# Patient Record
Sex: FEMALE | Race: WHITE
[De-identification: names, ages, dates, MRNs, and addresses within clinical notes are randomized per-mention and may not be internally consistent; named-entity substitution may affect disease eponyms.]

---

## 2018-12-30 ENCOUNTER — HOSPITAL ENCOUNTER (EMERGENCY)
Dept: HOSPITAL 41 - JD.ED | Age: 68
Discharge: NURSING, REHAB OR LONG-TERM CARE | End: 2018-12-30
Payer: MEDICARE

## 2018-12-30 DIAGNOSIS — F17.210: ICD-10-CM

## 2018-12-30 DIAGNOSIS — Z79.899: ICD-10-CM

## 2018-12-30 DIAGNOSIS — I21.21: Primary | ICD-10-CM

## 2018-12-30 PROCEDURE — 82553 CREATINE MB FRACTION: CPT

## 2018-12-30 PROCEDURE — 96365 THER/PROPH/DIAG IV INF INIT: CPT

## 2018-12-30 PROCEDURE — 94640 AIRWAY INHALATION TREATMENT: CPT

## 2018-12-30 PROCEDURE — 83735 ASSAY OF MAGNESIUM: CPT

## 2018-12-30 PROCEDURE — 85007 BL SMEAR W/DIFF WBC COUNT: CPT

## 2018-12-30 PROCEDURE — 93005 ELECTROCARDIOGRAM TRACING: CPT

## 2018-12-30 PROCEDURE — 85027 COMPLETE CBC AUTOMATED: CPT

## 2018-12-30 PROCEDURE — 99285 EMERGENCY DEPT VISIT HI MDM: CPT

## 2018-12-30 PROCEDURE — 36415 COLL VENOUS BLD VENIPUNCTURE: CPT

## 2018-12-30 PROCEDURE — 83880 ASSAY OF NATRIURETIC PEPTIDE: CPT

## 2018-12-30 PROCEDURE — 86140 C-REACTIVE PROTEIN: CPT

## 2018-12-30 PROCEDURE — 84484 ASSAY OF TROPONIN QUANT: CPT

## 2018-12-30 PROCEDURE — 96375 TX/PRO/DX INJ NEW DRUG ADDON: CPT

## 2018-12-30 PROCEDURE — 80053 COMPREHEN METABOLIC PANEL: CPT

## 2018-12-30 PROCEDURE — 85379 FIBRIN DEGRADATION QUANT: CPT

## 2018-12-30 PROCEDURE — 71045 X-RAY EXAM CHEST 1 VIEW: CPT

## 2018-12-30 PROCEDURE — 81001 URINALYSIS AUTO W/SCOPE: CPT

## 2018-12-30 NOTE — CR
Chest: Portable view of the chest was obtained.

 

Comparison: No prior chest x-ray.

 

Heart size is normal.  Atherosclerotic change is noted within the 

thoracic aorta.  Slightly lobulated right hemidiaphragm is seen.  Lung

 markings are mildly increased most likely accentuated from portable 

technique.  No acute parenchymal densities are appreciated.

 

Impression:

1.  Incidental findings.  Nothing acute is suspected on portable chest

 x-ray.

 

Diagnostic code #2

## 2018-12-30 NOTE — EDM.PDOC
ED HPI GENERAL MEDICAL PROBLEM





- General


Chief Complaint: Respiratory Problem


Stated Complaint: SOB


Time Seen by Provider: 12/30/18 15:16


Source of Information: Reports: Patient


History Limitations: Reports: No Limitations





- History of Present Illness


INITIAL COMMENTS - FREE TEXT/NARRATIVE: 


68-year-old female presents to the ED with acute onset of severe dyspnea and 

inability to get her breath. Patient has been traveling for the last day and a 

half. Traveling from [place] back to [place]. Just got off the airplane and 

found out that she was extremely dyspneic getting off the airplane and for a 

while thought she was given a keel over. She does have a very productive 

sounding cough. She states that she was fine when she got on the airplane but 

things seem to change in the last 24 hours. No fever or chills. She smokes 3-5 

cigarettes a day. Has known COPD. Does not use oxygen at home. She uses 

albuterol inhaler when necessary. Has seasonal allergies requiring Kenalog 

injections in the spring and fall. She states her son's home was quite moldy 

out of [place]. She denies any chest pain. She can't bring up any sputum. He 

did vomit once getting off the airplane which is mostly mucus and she feels 

better since vomiting. O2 sats on room air are 91-93%. She is audibly wheezing. 

She's not sure if she has any history of congestive failure. By history likely 

has cor pulmonale.





Onset: Today


Onset Date: 12/30/18


Onset Time: 13:00


Duration: Hour(s):


Location: Reports: Chest (Sudden onset of worsening dyspnea on minimal exertion 

with harsh paroxysmal productive sounding cough dizziness and weakness. 

Vomiting 1.)


Quality: Reports: Other (Dyspnea at rest)


Severity: Moderate (8 out of 10)


Improves with: Reports: Rest


Worsens with: Reports: Movement


Context: Reports: Other (Has been traveling just got off the airplane after 

traveling from [place] to [facility] to [place] to [place].).  Denies

: Activity, Exercise, Lifting, Sick Contact, Trauma


Associated Symptoms: Reports: Cough, cough w sputum, Loss of Appetite, Malaise, 

Nausea/Vomiting, Shortness of Breath, Weakness.  Denies: Confusion, Chest Pain, 

Diaphoresis, Fever/Chills, Headaches, Rash, Seizure (Vomited once after getting 

off the airplane), Syncope


Treatments PTA: Reports: Other (see below) (Generalized has taken no 

medications.)





- Related Data


 Allergies











Allergy/AdvReac Type Severity Reaction Status Date / Time


 


No Known Allergies Allergy   Verified 12/30/18 14:41











Home Meds: 


 Home Meds





Albuterol [Proventil HFA] 1 inh INH ASDIRECTED PRN 12/30/18 [History]


LORazepam [Ativan] 1 mg PO TID 12/30/18 [History]


PARoxetine HCl [Paxil] 40 mg PO DAILY 12/30/18 [History]


Zolpidem [Ambien] 10 mg PO BEDTIME 12/30/18 [History]


clonazePAM [Klonopin] 1 mg PO BID 12/30/18 [History]











Past Medical History


Respiratory History: Reports: Other (See Below)


Other Respiratory History: allergies


Psychiatric History: Reports: Anxiety





Social & Family History





- Tobacco Use


Smoking Status *Q: Current Every Day Smoker


Years of Tobacco use: 45


Packs/Tins Daily: 0.5





- Caffeine Use


Caffeine Use: Reports: Coffee





- Recreational Drug Use


Recreational Drug Use: No





- Living Situation & Occupation


Living situation: Reports: 


Occupation: Retired





ED ROS GENERAL





- Review of Systems


Review Of Systems: See Below


Constitutional: Reports: Malaise, Weakness, Fatigue, Decreased Appetite.  Denies

: Fever, Chills


HEENT: Reports: Glasses


Respiratory: Reports: Shortness of Breath, Wheezing, Cough, Sputum.  Denies: 

Pleuritic Chest Pain, Hemoptysis


Cardiovascular: Reports: Dyspnea on Exertion (Chronically), Lightheadedness.  

Denies: Chest Pain, Blood Pressure Problem, Claudication, Edema, Orthopnea


Endocrine: Reports: Fatigue


GI/Abdominal: Reports: Constipation (Occasional positive constipation), Nausea, 

Vomiting (Vomited once after getting off the airplane but feels better now.)


: Reports: Frequency, Incontinence (Urge and stress components)


Musculoskeletal: Reports: Back Pain, Joint Pain


Skin: Reports: No Symptoms (These hips and neck and shoulders at times)


Neurological: Reports: No Symptoms


Psychiatric: Reports: No Symptoms


Hematologic/Lymphatic: Reports: No Symptoms


Immunologic: Reports: No Symptoms





ED EXAM, GENERAL





- Physical Exam


Exam: See Below


Exam Limited By: No Limitations


General Appearance: Alert, WD/WN, Moderate Distress, Other (Radiation color)


Eye Exam: Bilateral Eye: Normal Inspection


Throat/Mouth: Normal Inspection, Normal Oropharynx


Head: Atraumatic, Normocephalic


Neck: Normal Inspection, Supple, Non-Tender, Full Range of Motion.  No: 

Lymphadenopathy (L), Lymphadenopathy (R)


Respiratory/Chest: No Accessory Muscle Use, Respiratory Distress (Mild 

tachypnea 20/m.), Decreased Breath Sounds (No air entry to the lower third of 

each lung bilaterally.), Wheezing (Use inspiratory and especially expiratory 

wheezes throughout.).  No: Rales, Rhonchi


Cardiovascular: No Gallop, No Murmur, No Rub, Tachycardia


Peripheral Pulses: 1+: Posterior Tibial (L) (Pulses are very weak to her feet. 

Suspect a component of peripheral vascular disease.), Posterior Tibial (R), 

Dorsalis Pedis (L), Dorsalis Pedis (R)


GI/Abdominal: Normal Bowel Sounds, Soft, Non-Tender, No Organomegaly, No 

Abnormal Bruit, No Mass, Pelvis Stable, Other (Ronald minimal distention of her 

epigastrium compatible with aerophagia)


Back Exam: Normal Inspection, Full Range of Motion.  No: CVA Tenderness (L), 

CVA Tenderness (R)


Extremities: Normal Inspection, Normal Range of Motion, Non-Tender, Other


Neurological: Alert, Oriented (Cyanosis of her hands bilaterally), CN II-XII 

Intact, Normal Cognition


Psychiatric: Normal Affect, Normal Mood


Skin Exam: Warm, Dry, Other (Duration color due to cyanosis.)





EKG INTERPRETATION


EKG Date: 12/30/18


Time: 15:40


Rhythm: Other


Rate (Beats/Min): 114


Axis: Normal


QRS: Other (Initial poor R-wave progression cannot rule out old anteroseptal 

myocardial infarction. No P waves are inverted in V1 and V2.)


ST-T: Other (Mild ST segment elevation V3 to V6. There is also a diffuse early 

repolarization pattern. Cannot rule out apical ischemia. There is a Q-wave in 

aVL only which is nonspecific)


EKG Interpretation Comments: 





Abnormal ECG comment ECG #2 done at 1711 hrs. shows sinus tachycardia at 1 18/

m. Initial poor R-wave progression stool appreciated. Cannot rule out old 

anteroseptal myocardial infarction. There is a diffuse early repolarization 

pattern versus ST segment elevation in leads V4 V5. Definite Q-wave in aVL and 

early Q-wave in lead 1. There is right atrial hypertrophy pattern. Essentially 

unchanged from ECG #1 --1-1/2 hours ago





Course





- Vital Signs


Last Recorded V/S: 


 Last Vital Signs











Temp  37.4 C   12/30/18 18:29


 


Pulse  108 H  12/30/18 18:29


 


Resp  18   12/30/18 18:29


 


BP  103/65   12/30/18 18:29


 


Pulse Ox  98   12/30/18 18:29














- Orders/Labs/Meds


Orders: 


 Active Orders 24 hr











 Category Date Time Status


 


 EKG Documentation Completion [RC] STAT Care  12/30/18 15:24 Active


 


 Oxygen Therapy [RC] ASDIRECTED Care  12/30/18 15:24 Active


 


 Peripheral IV Care [RC] .AS DIRECTED Care  12/30/18 15:24 Active


 


 RT Aerosol Therapy [RC] ASDIRECTED Care  12/30/18 15:26 Active


 


 RT Aerosol Therapy [RC] ASDIRECTED Care  12/30/18 16:21 Active


 


 Peripheral IV Insertion Adult [OM.PC] Stat Oth  12/30/18 15:24 Ordered











Labs: 


 Laboratory Tests











  12/30/18 12/30/18 12/30/18 Range/Units





  15:30 15:30 15:30 


 


WBC  17.23 H    (3.98-10.04)  K/mm3


 


RBC  4.69    (3.98-5.22)  M/mm3


 


Hgb  14.5    (11.2-15.7)  gm/L


 


Hct  42.0    (34.1-44.9)  %


 


MCV  89.6    (79.4-94.8)  fl


 


MCH  30.9    (25.6-32.2)  pg


 


MCHC  34.5    (32.2-35.5)  g/dl


 


RDW Std Deviation  44.0    (36.4-46.3)  fL


 


Plt Count  363    (182-369)  K/mm3


 


MPV  10.1    (9.4-12.3)  fl


 


Neutrophils % (Manual)  85 H    (40-60)  %


 


Band Neutrophils %  5    (0-10)  %


 


Lymphocytes % (Manual)  5 L    (20-40)  %


 


Atypical Lymphs %  0    %


 


Monocytes % (Manual)  4    (2-10)  %


 


Eosinophils % (Manual)  0 L    (0.7-5.8)  %


 


Basophils % (Manual)  1    (0.1-1.2)  


 


Toxic Granulation  1+ slight    


 


Platelet Estimate  Adequate    


 


Plt Morphology Comment  Normal    


 


RBC Morph Comment  Normal    


 


D-Dimer, Quantitative    0.57 H  (0.19-0.50)  mg/L


 


Sodium   131 L   (136-145)  mEq/L


 


Potassium   3.5   (3.5-5.1)  mEq/L


 


Chloride   92 L   ()  mEq/L


 


Carbon Dioxide   29   (21-32)  mEq/L


 


Anion Gap   13.5   (5-15)  


 


BUN   25 H   (7-18)  mg/dL


 


Creatinine   1.0   (0.55-1.02)  mg/dL


 


Est Cr Clr Drug Dosing   45.78   mL/min


 


Estimated GFR (MDRD)   55   (>60)  mL/min


 


BUN/Creatinine Ratio   25.0 H   (14-18)  


 


Glucose   123 H   ()  mg/dL


 


Calcium   9.6   (8.5-10.1)  mg/dL


 


Magnesium   2.0   (1.8-2.4)  mg/dl


 


Total Bilirubin   0.5   (0.2-1.0)  mg/dL


 


AST   21   (15-37)  U/L


 


ALT   23   (14-59)  U/L


 


Alkaline Phosphatase   84   ()  U/L


 


CK-MB (CK-2)   10.7 H   (0-3.6)  ng/ml


 


Troponin I   1.786 H*   (0.00-0.056)  ng/mL


 


C-Reactive Protein   28.5 H*   (<1.0)  mg/dL


 


NT-Pro-B Natriuret Pep     (0-125)  pg/mL


 


Total Protein   7.9   (6.4-8.2)  g/dl


 


Albumin   3.7   (3.4-5.0)  g/dl


 


Globulin   4.2   gm/dL


 


Albumin/Globulin Ratio   0.9 L   (1-2)  


 


Urine Color     (Yellow)  


 


Urine Appearance     (Clear)  


 


Urine pH     (5.0-8.0)  


 


Ur Specific Gravity     (1.005-1.030)  


 


Urine Protein     (Negative)  


 


Urine Glucose (UA)     (Negative)  


 


Urine Ketones     (Negative)  


 


Urine Occult Blood     (Negative)  


 


Urine Nitrite     (Negative)  


 


Urine Bilirubin     (Negative)  


 


Urine Urobilinogen     (0.2-1.0)  


 


Ur Leukocyte Esterase     (Negative)  


 


Urine RBC     (0-5)  /hpf


 


Urine WBC     (0-5)  /hpf


 


Ur Epithelial Cells     


 


Ur Squamous Epith Cells     (0-5)  /hpf


 


Urine Bacteria     (FEW)  /hpf


 


Hyaline Casts     (0-5)  /lpf


 


Fine Granular Casts     (0-5)  /lpf


 


Urine Mucus     (FEW)  /hpf














  12/30/18 12/30/18 Range/Units





  15:30 17:48 


 


WBC    (3.98-10.04)  K/mm3


 


RBC    (3.98-5.22)  M/mm3


 


Hgb    (11.2-15.7)  gm/L


 


Hct    (34.1-44.9)  %


 


MCV    (79.4-94.8)  fl


 


MCH    (25.6-32.2)  pg


 


MCHC    (32.2-35.5)  g/dl


 


RDW Std Deviation    (36.4-46.3)  fL


 


Plt Count    (182-369)  K/mm3


 


MPV    (9.4-12.3)  fl


 


Neutrophils % (Manual)    (40-60)  %


 


Band Neutrophils %    (0-10)  %


 


Lymphocytes % (Manual)    (20-40)  %


 


Atypical Lymphs %    %


 


Monocytes % (Manual)    (2-10)  %


 


Eosinophils % (Manual)    (0.7-5.8)  %


 


Basophils % (Manual)    (0.1-1.2)  


 


Toxic Granulation    


 


Platelet Estimate    


 


Plt Morphology Comment    


 


RBC Morph Comment    


 


D-Dimer, Quantitative    (0.19-0.50)  mg/L


 


Sodium    (136-145)  mEq/L


 


Potassium    (3.5-5.1)  mEq/L


 


Chloride    ()  mEq/L


 


Carbon Dioxide    (21-32)  mEq/L


 


Anion Gap    (5-15)  


 


BUN    (7-18)  mg/dL


 


Creatinine    (0.55-1.02)  mg/dL


 


Est Cr Clr Drug Dosing    mL/min


 


Estimated GFR (MDRD)    (>60)  mL/min


 


BUN/Creatinine Ratio    (14-18)  


 


Glucose    ()  mg/dL


 


Calcium    (8.5-10.1)  mg/dL


 


Magnesium    (1.8-2.4)  mg/dl


 


Total Bilirubin    (0.2-1.0)  mg/dL


 


AST    (15-37)  U/L


 


ALT    (14-59)  U/L


 


Alkaline Phosphatase    ()  U/L


 


CK-MB (CK-2)    (0-3.6)  ng/ml


 


Troponin I    (0.00-0.056)  ng/mL


 


C-Reactive Protein    (<1.0)  mg/dL


 


NT-Pro-B Natriuret Pep  580 H   (0-125)  pg/mL


 


Total Protein    (6.4-8.2)  g/dl


 


Albumin    (3.4-5.0)  g/dl


 


Globulin    gm/dL


 


Albumin/Globulin Ratio    (1-2)  


 


Urine Color   Yellow  (Yellow)  


 


Urine Appearance   Slt cloudy H  (Clear)  


 


Urine pH   6.0  (5.0-8.0)  


 


Ur Specific Gravity   > or = 1.030  (1.005-1.030)  


 


Urine Protein   3+ H  (Negative)  


 


Urine Glucose (UA)   Negative  (Negative)  


 


Urine Ketones   1+ H  (Negative)  


 


Urine Occult Blood   3+ H  (Negative)  


 


Urine Nitrite   Negative  (Negative)  


 


Urine Bilirubin   1+ H  (Negative)  


 


Urine Urobilinogen   0.2  (0.2-1.0)  


 


Ur Leukocyte Esterase   Negative  (Negative)  


 


Urine RBC   5-10 H  (0-5)  /hpf


 


Urine WBC   0-5  (0-5)  /hpf


 


Ur Epithelial Cells   Not Reportable  


 


Ur Squamous Epith Cells   5-10 H  (0-5)  /hpf


 


Urine Bacteria   Moderate H  (FEW)  /hpf


 


Hyaline Casts   0-5  (0-5)  /lpf


 


Fine Granular Casts   0-5  (0-5)  /lpf


 


Urine Mucus   Moderate H  (FEW)  /hpf











Meds: 


Medications














Discontinued Medications














Generic Name Dose Route Start Last Admin





  Trade Name Carlos Manuel  PRN Reason Stop Dose Admin


 


Albuterol/Ipratropium  3 ml  12/30/18 15:25  12/30/18 15:44





  Duoneb 3.0-0.5 Mg/3 Ml  NEB  12/30/18 15:26  3 ml





  ONETIME ONE   Administration





     





     





     





     


 


Albuterol/Ipratropium  3 ml  12/30/18 16:20  12/30/18 16:31





  Duoneb 3.0-0.5 Mg/3 Ml  NEB  12/30/18 16:21  3 ml





  ONETIME STA   Administration





     





     





     





     


 


Aspirin  324 mg  12/30/18 17:04  12/30/18 17:38





  Aspirin  PO  12/30/18 17:05  324 mg





  ONETIME ONE   Administration





     





     





     





     


 


Furosemide  40 mg  12/30/18 17:48  12/30/18 18:06





  Lasix  IVPUSH  12/30/18 17:49  40 mg





  NOW ONE   Administration





     





     





     





     


 


Heparin Sodium (Porcine)  5,000 units  12/30/18 17:04  12/30/18 17:38





  Heparin Sodium  IVPUSH  12/30/18 17:05  5,000 units





  ONETIME ONE   Administration





     





     





     





     


 


Heparin Sodium/Dextrose  25,000 units in 500 mls @ 15 mls/hr  12/30/18 17:15  12 /30/18 17:38





  Heparin 25,000 Units In D5w 500 Ml  IV   750 units/hr





  ASDIRECTED RAQUEL   15 mls/hr





     Administration





     





     





  750 UNITS/HR   


 


Sodium Chloride  10 ml  12/30/18 15:24  12/30/18 15:37





  Saline Flush  FLUSH   10 ml





  ASDIRECTED PRN   Administration





  Keep Vein Open   





     





     





     














- Radiology Interpretation


Free Text/Narrative:: 


68-year-old female presents to the ED with acute onset of dyspnea well after 

getting off the airplane today. She's been traveling all day from [place]. She 

states she is extremely short of breath on minimal exertion with a productive 

cough. She states it came on all of a sudden. There are no [place] for only 4 

days. He has known COPD and still smokes 3-5 cigarettes a day. O2 sats are 91-93

% on room air and she does appear to have some central cyanosis as well as a 

cool cyanosis


 Examination her lungs shows decreased air entry are absent of any air entry to 

the lower one third of lung fields bilaterally. Diffuse expiratory wheezes 

throughout compatible with severe exacerbation of her COPD. She reports was 

quite moldy out of her son's place in [place]. The humidity was much higher as 

it's been raining as well. Oxygen at 2 L/m by nasal specks. DuoNeb to be given. 

Saline lock. Routine labs to include cardiac markers and d-dimer. BNP will also 

be done. One view chest x-ray to be done.





- Re-Assessments/Exams


Free Text/Narrative Re-Assessment/Exam: 





12/30/18 16:22  chest x-ray reveals very mild hyperinflation of both lung 

fields. Cardiac silhouette appears to be normal. Parous to be an exenterated 

fat pad adjacent to the right lower heart border. Lungs are clear without any 

infiltrates. She did get marked improvement in breathing with initial dose of 

DuoNeb. Will be repeated at this time. Her ECG suggests initial very poor R-

wave progression likely old anteroseptal myocardial infarction. There is a 

diffuse early repolarization pattern but also some suggestive ST segment 

elevation V5 and V6 for cannot rule out an apical wall MI. Reads out a lateral 

wall infarct I think because of Q waves identified in aVL but the limb leads 

show very decreased breath age due to COPD pattern. Cannot rule out myocardial 

infarction at this time will await cardiac markers





12/30/18 16:58 White count is elevated at 17.23 with left shift of 85% 

neutrophils and 5% bands. Hemoglobin is 14.5 with hematocrit of 42.0 MCV is 

89.6. White count is 3 63,000. D-dimer is 0.57 upper limits of normal. Sodium 

is slightly low at 131 with potassium of 3.5. Port is 92 with a bicarbonate 29. 

Anion gap is 13.5. BUN is 25. Creatinine is 1.0. GFR is 55. Glucose is 123. 

Calcium is 9.6. Magnesium 2.0. Liver function normal. CK-MB fraction is 

elevated at 10.7. Troponin I is elevated at 1.786 indicating recent myocardial 

infarction. C-reactive protein is 28.5. BNP is 580. Total protein is 7.9 with 

an albumin fraction of 3.7.





12/30/18 17:00: Patient appraised of the findings of elevated cardiac markers 

suggesting myocardial infarction 1 squamous gain on firm questioning at no time 

has she expressed any chest pain and still has no chest pain. She is breathing 

better after 2 treatments with DuoNeb. In the chest x-ray is negative. Her BNP 

is 580 and I will give her dose of Lasix IV. Aspirin 324 mg chewed and started 

on heparin and drip at 1000 units an hour with a 5000 unit bolus. ECG is 

ordered. She prefers to be transferred to [facility] for cardiology 

assessment and treatment.Will also be given Lasix 40mg IV. 





12/30/18 17:40 I have spoken with  --cardiologist and Dr Smith -

hospitalist at [facility] in [place] and the patient be transferred to that 

facility by ground ambulance. Dr. Smith will be the primary admitting 

physician. Due to elevated CRP which suggested an infective process without a 

fever I'm ordered a urinalysis but she has no symptoms in this regard. Cough is 

productive sounding but there is no evidence of pneumonia on chest x-ray and 

she has a benign abdomen. Her second ECG is essentially unchanged from the 

first with sinus tachycardia at 1 18/m. However her blood pressure is 103/56 

and she is not a candidate for beta blocker. There is initial R-wave 

progression. There is a diffuse early repolarization pattern I believe due to 

the rate. It is equivocal whether there is any ST segment elevation in V5 or 

V6. There also may be very slight ST segment elevation in aVL with a Q wave. 

She is not a candidate for thrombolytic therapy.She has absolutely no chest 

pain.

















Departure





- Departure


Time of Disposition: 18:25


Disposition: DC/Tfer to Acute Hospital 02


Condition: Serious


Clinical Impression: 


Acute myocardial infarction involving left circumflex coronary artery


Qualifiers:


 Myocardial infarction type: unspecified Qualified Code(s): I21.21 - ST 

elevation (STEMI) myocardial infarction involving left circumflex coronary 

artery








- Discharge Information


*PRESCRIPTION DRUG MONITORING PROGRAM REVIEWED*: Not Applicable


*COPY OF PRESCRIPTION DRUG MONITORING REPORT IN PATIENT JUDSON: Not Applicable


Referrals: 


PCP,Not In Area [Primary Care Provider] - 


Forms:  ED Department Discharge


Additional Instructions: 


Patient's ECG was abnormal but not suggestive of acute ST segment elevation MI. 

She ruled in positive for cardiac markers with a CK-MB fraction of 10.5 and a 

troponin I of 1.76. She absolutely had no chest pain during her ER visit or 

prior. She was transferred to [facility] Dr. Smith has accepted 

care. Transported by ground ambulance





- My Orders


Last 24 Hours: 


My Active Orders





12/30/18 15:24


EKG Documentation Completion [RC] STAT 


Oxygen Therapy [RC] ASDIRECTED 


Peripheral IV Care [RC] .AS DIRECTED 


Peripheral IV Insertion Adult [OM.PC] Stat 





12/30/18 15:26


RT Aerosol Therapy [RC] ASDIRECTED 





12/30/18 16:21


RT Aerosol Therapy [RC] ASDIRECTED 














- Assessment/Plan


Last 24 Hours: 


My Active Orders





12/30/18 15:24


EKG Documentation Completion [RC] STAT 


Oxygen Therapy [RC] ASDIRECTED 


Peripheral IV Care [RC] .AS DIRECTED 


Peripheral IV Insertion Adult [OM.PC] Stat 





12/30/18 15:26


RT Aerosol Therapy [RC] ASDIRECTED 





12/30/18 16:21


RT Aerosol Therapy [RC] ASDIRECTED

## 2019-01-04 ENCOUNTER — HOSPITAL ENCOUNTER (EMERGENCY)
Dept: HOSPITAL 41 - JD.ED | Age: 69
Discharge: HOME | End: 2019-01-04
Payer: MEDICARE

## 2019-01-04 DIAGNOSIS — J44.9: ICD-10-CM

## 2019-01-04 DIAGNOSIS — I25.2: ICD-10-CM

## 2019-01-04 DIAGNOSIS — Z87.891: ICD-10-CM

## 2019-01-04 DIAGNOSIS — I50.9: Primary | ICD-10-CM

## 2019-01-04 PROCEDURE — 99285 EMERGENCY DEPT VISIT HI MDM: CPT

## 2019-01-04 PROCEDURE — 93005 ELECTROCARDIOGRAM TRACING: CPT

## 2019-01-04 PROCEDURE — 82553 CREATINE MB FRACTION: CPT

## 2019-01-04 PROCEDURE — 85007 BL SMEAR W/DIFF WBC COUNT: CPT

## 2019-01-04 PROCEDURE — 84484 ASSAY OF TROPONIN QUANT: CPT

## 2019-01-04 PROCEDURE — 94640 AIRWAY INHALATION TREATMENT: CPT

## 2019-01-04 PROCEDURE — 83735 ASSAY OF MAGNESIUM: CPT

## 2019-01-04 PROCEDURE — 85610 PROTHROMBIN TIME: CPT

## 2019-01-04 PROCEDURE — 80053 COMPREHEN METABOLIC PANEL: CPT

## 2019-01-04 PROCEDURE — 36415 COLL VENOUS BLD VENIPUNCTURE: CPT

## 2019-01-04 PROCEDURE — 83880 ASSAY OF NATRIURETIC PEPTIDE: CPT

## 2019-01-04 PROCEDURE — 85027 COMPLETE CBC AUTOMATED: CPT

## 2019-01-04 PROCEDURE — 71045 X-RAY EXAM CHEST 1 VIEW: CPT

## 2019-01-04 NOTE — EDM.PDOC
ED HPI GENERAL MEDICAL PROBLEM





- General


Chief Complaint: Respiratory Problem


Stated Complaint: SHORT OF BREATH


Time Seen by Provider: 01/04/19 00:52


Source of Information: Reports: Patient


History Limitations: Reports: No Limitations





- History of Present Illness


INITIAL COMMENTS - FREE TEXT/NARRATIVE: 


68-year-old female presents the ED with family members. Chief complaint is 

shortness of breath on minimal exertion. Of note I had seen the patient through 

the ED on December 30 after she had gotten home from [place]. After she got 

off the plane in [place] she felt quite nauseated and lightheaded and did 

vomit 1. She did proceed however to drive back to [place]. She did developed 

increased shortness of breath en route to [place] and was came directly to 

the hospital. At that time she was identified to have a evidence of an acute 

myocardial infarction with changes on her ECG suggesting a non-STEMI and 

elevated troponin I of 1.786. CK-MB fraction was also elevated at 10.3. She is 

therefore transferred to [facility] the same day. Apparently she 

was admitted to the med surgical floor per hospitalist but her condition 

deteriorated and she required transfer to the medical intensive care unit. It 

appeared that she developed hyper And and hypoxia due to her COPD and worsening 

congestive failure and cardiogenic shock was felt to be the etiology. She did 

require intubation and ventilator management. She subsequent digitally did have 

cardiology assessment which revealed a bedside ultrasound to be very abnormal 

with akinesis of the left ventricle and therefore was taken for emergency 

cardiac catheter. She did not have any significant coronary disease however on 

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 Some 

cold broken heart syndrome. CT of the chest did not reveal any PE but there was 

some suggestion of developing right upper lobar pneumonia and thus she was 

treated with intravenous antibiotics for pneumonia. She was discharged 

yesterday and his condition is deteriorated since she came home. She is on 

antibiotics for pneumonia. She has of course stop smoking. Does have a 

productive sounding cough.





Onset: Gradual (Gradually worsening shortness of breath over the last 24 hours.)


Onset Date: 01/03/19


Duration: Hour(s):


Location: Reports: Chest (Increasing shortness of breath worse with minimal 

exertion.)


Quality: Reports: Other


Severity: Severe (Dyspnea)


Improves with: Reports: Rest


Worsens with: Reports: Movement


Context: Denies: Activity, Exercise, Lifting, Sick Contact, Trauma, Other


Associated Symptoms: Reports: Cough, cough w sputum, Loss of Appetite, Malaise, 

Shortness of Breath, Other (Brownish in color still has a sore throat and 

hoarse voice from being intubated).  Denies: Fever/Chills, Headaches


Treatments PTA: Reports: EKG, Other (see below) (None)





- Related Data


 Allergies











Allergy/AdvReac Type Severity Reaction Status Date / Time


 


No Known Allergies Allergy   Verified 12/30/18 14:41











Home Meds: 


 Home Meds





Albuterol [Proventil HFA] 1 inh INH ASDIRECTED PRN 12/30/18 [History]


LORazepam [Ativan] 1 mg PO TID 12/30/18 [History]


PARoxetine HCl [Paxil] 40 mg PO DAILY 12/30/18 [History]


Zolpidem [Ambien] 10 mg PO BEDTIME 12/30/18 [History]


clonazePAM [Klonopin] 1 mg PO BID 12/30/18 [History]


Albuterol/Ipratropium [DuoNeb 3.0-0.5 MG/3 ML] 3 ml NEB TID PRN #100 neb 01/04/ 19 [Rx]


Aspirin 81 mg PO DAILY 01/04/19 [History]


Azithromycin 500 mg PO DAILY 01/04/19 [History]


Benzonatate 100 mg PO TID 01/04/19 [History]


Budesonide/Formoterol [Symbicort 160-4.5 MCG] 2 puff INH BID 01/04/19 [History]


Carvedilol 6.25 mg PO BID 01/04/19 [History]


Cefuroxime [Ceftin] 500 mg PO BID 01/04/19 [History]


Furosemide [Lasix] 40 mg PO BID #60 tab 01/04/19 [Rx]


Ipratropium [Atrovent HFA] 2 puff INH TID 01/04/19 [History]


Montelukast [Singulair] 10 mg PO BEDTIME 01/04/19 [History]


Nicotine [Nicotine Patch] 1 patch TOP DAILY 01/04/19 [History]


Potassium Chloride 20 meq PO BID #60 tablet.er 01/04/19 [Rx]


predniSONE [Prednisone] 0 mg PO ASDIRECTED 01/04/19 [History]











Past Medical History


Cardiovascular History: Reports: Cardiomyopathy (With stress cardiomyopathy 

December 31, 2018. Angiogram did not show any significant coronary disease that 

required stenting.), MI, Pulmonary Hypertension


Respiratory History: Reports: Bronchitis, Recurrent, COPD, SOB, Other (See Below

)


Other Respiratory History: allergies


Psychiatric History: Reports: Anxiety





Social & Family History





- Tobacco Use


Smoking Status *Q: Former Smoker


Years of Tobacco use: 46


Used Tobacco, but Quit: Yes


Month/Year Tobacco Last Used: January 2019





- Caffeine Use


Caffeine Use: Reports: Coffee, Energy Drinks, Soda, Tea





- Recreational Drug Use


Recreational Drug Use: No





- Living Situation & Occupation


Living situation: Reports: 


Occupation: Retired





ED ROS GENERAL





- Review of Systems


Review Of Systems: See Below


Constitutional: Reports: Malaise, Weakness, Fatigue, Decreased Appetite, Weight 

Loss.  Denies: Fever, Chills


HEENT: Reports: Glasses, Other


Respiratory: Reports: Shortness of Breath, Wheezing, Cough, Sputum.  Denies: 

Pleuritic Chest Pain


Cardiovascular: Reports: Dyspnea on Exertion (Chronically but worse lately.), 

Lightheadedness.  Denies: Chest Pain (Brownish in color), Blood Pressure Problem

, Claudication, Edema, Orthopnea


Endocrine: Reports: Fatigue


GI/Abdominal: Reports: Decreased Appetite.  Denies: Abdominal Pain, Anorexia, 

Black Stool, Bloody Stool, Difficulty Swallowing


: Reports: Frequency


Musculoskeletal: Reports: Back Pain


Skin: Reports: Bruising (She has marked bruising both forearms as both radial 

arteries were utilized for angiogram. She has occurred Koban dressing on her 

right forearm as it swelled up quite badly after the angiogram.)


Neurological: Reports: Dizziness


Psychiatric: Reports: Anxiety


Hematologic/Lymphatic: Reports: No Symptoms


Immunologic: Reports: No Symptoms





ED EXAM, GENERAL





- Physical Exam


Exam: See Below


Exam Limited By: No Limitations


General Appearance: Alert, Moderate Distress, Other (Shortness of breath. Vital 

signs show heart rate of 97/m respiratory rate of 18-20/m sats of 91-94% on 

room air blood pressure was 154/84.)


Eye Exam: Bilateral Eye: Normal Inspection


Throat/Mouth: Other (Tongue is mildly dry. Her voice is quite hoarse from being 

intubated.)


Head: Atraumatic, Normocephalic


Neck: Other (Bruising and swelling of the left lateral neck where she had a 

internal jugular vein catheter placed.)


Respiratory/Chest: Respiratory Distress (Mild tachypnea.), Rales (As bilateral 

rales both lower lobes.).  No: Wheezing


Cardiovascular: Regular Rate, Rhythm, No Edema, No Gallop, No Murmur, No Rub.  

No: Normal Peripheral Pulses


Peripheral Pulses: 1+: Posterior Tibial (L), Posterior Tibial (R), Dorsalis 

Pedis (L), Dorsalis Pedis (R)


GI/Abdominal: Normal Bowel Sounds, Soft, Non-Tender, No Organomegaly, No 

Abnormal Bruit, No Mass, Pelvis Stable


Back Exam: Normal Inspection, Full Range of Motion.  No: CVA Tenderness (L), 

CVA Tenderness (R)


Extremities: Other (There is marked ecchymoses of both forearms up to the 

antecubital fossa bilaterally where she's had numerous phlebotomies and she's 

had both radial arteries catheterized for angiography.)


Neurological: Alert, Oriented, CN II-XII Intact, Normal Cognition


Psychiatric: Anxious


Skin Exam: Warm, Dry, Normal Color, No Rash





EKG INTERPRETATION


EKG Date: 01/04/19


Time: 00:32


Rhythm: NSR


Rate (Beats/Min): 90


Axis: Normal


P-Wave: Present


QRS: Other (Initial poor R-wave progression. Then early R-wave transition 

suggesting possibility of septal hypertrophy.)


ST-T: Other (She has diffuse T-wave inversion in lead 1 feet 2 aVL aVF and 

leads V2 to V6 just the possibility of global ischemia.)


QT: Normal


EKG Interpretation Comments: 





Abnormal ECG





Course





- Vital Signs


Last Recorded V/S: 


 Last Vital Signs











Temp  36.3 C   01/04/19 00:26


 


Pulse  97   01/04/19 00:26


 


Resp  18   01/04/19 00:26


 


BP  154/84 H  01/04/19 00:26


 


Pulse Ox  91 L  01/04/19 02:40














- Orders/Labs/Meds


Orders: 


 Active Orders 24 hr











 Category Date Time Status


 


 EKG Documentation Completion [RC] STAT Care  01/04/19 00:51 Active


 


 Oxygen Therapy [RC] ASDIRECTED Care  01/04/19 00:52 Active


 


 RT Aerosol Therapy [RC] ASDIRECTED Care  01/04/19 00:53 Active


 


 RT Aerosol Therapy [RC] ASDIRECTED Care  01/04/19 02:40 Active


 


 Chest 1V Frontal [CR] Stat Exams  01/04/19 00:51 Taken











Labs: 


 Laboratory Tests











  01/04/19 01/04/19 01/04/19 Range/Units





  01:10 01:10 01:10 


 


WBC  10.67 H    (3.98-10.04)  K/mm3


 


RBC  4.21    (3.98-5.22)  M/mm3


 


Hgb  13.0    (11.2-15.7)  gm/L


 


Hct  38.0    (34.1-44.9)  %


 


MCV  90.3    (79.4-94.8)  fl


 


MCH  30.9    (25.6-32.2)  pg


 


MCHC  34.2    (32.2-35.5)  g/dl


 


RDW Std Deviation  43.2    (36.4-46.3)  fL


 


Plt Count  406 H    (182-369)  K/mm3


 


MPV  9.7    (9.4-12.3)  fl


 


Neutrophils % (Manual)  81 H    (40-60)  %


 


Band Neutrophils %  0    (0-10)  %


 


Lymphocytes % (Manual)  7 L    (20-40)  %


 


Atypical Lymphs %  1    %


 


Monocytes % (Manual)  10    (2-10)  %


 


Eosinophils % (Manual)  1    (0.7-5.8)  %


 


Basophils % (Manual)  0 L    (0.1-1.2)  


 


Platelet Estimate  Increased    


 


Plt Morphology Comment  Normal    


 


RBC Morph Comment  Normal    


 


PT   10.1   (9.5-12.1)  SECONDS


 


INR   0.93   


 


Sodium    137  (136-145)  mEq/L


 


Potassium    4.4  (3.5-5.1)  mEq/L


 


Chloride    98  ()  mEq/L


 


Carbon Dioxide    30  (21-32)  mEq/L


 


Anion Gap    13.4  (5-15)  


 


BUN    20 H  (7-18)  mg/dL


 


Creatinine    0.8  (0.55-1.02)  mg/dL


 


Est Cr Clr Drug Dosing    57.22  mL/min


 


Estimated GFR (MDRD)    > 60  (>60)  mL/min


 


BUN/Creatinine Ratio    25.0 H  (14-18)  


 


Glucose    129 H  ()  mg/dL


 


Calcium    9.7  (8.5-10.1)  mg/dL


 


Magnesium    1.9  (1.8-2.4)  mg/dl


 


Total Bilirubin    0.3  (0.2-1.0)  mg/dL


 


AST    29  (15-37)  U/L


 


ALT    78 H  (14-59)  U/L


 


Alkaline Phosphatase    113  ()  U/L


 


CK-MB (CK-2)    2.1  (0-3.6)  ng/ml


 


Troponin I    0.136 H*  (0.00-0.056)  ng/mL


 


NT-Pro-B Natriuret Pep     (0-125)  pg/mL


 


Total Protein    6.9  (6.4-8.2)  g/dl


 


Albumin    3.0 L  (3.4-5.0)  g/dl


 


Globulin    3.9  gm/dL


 


Albumin/Globulin Ratio    0.8 L  (1-2)  














  01/04/19 Range/Units





  01:10 


 


WBC   (3.98-10.04)  K/mm3


 


RBC   (3.98-5.22)  M/mm3


 


Hgb   (11.2-15.7)  gm/L


 


Hct   (34.1-44.9)  %


 


MCV   (79.4-94.8)  fl


 


MCH   (25.6-32.2)  pg


 


MCHC   (32.2-35.5)  g/dl


 


RDW Std Deviation   (36.4-46.3)  fL


 


Plt Count   (182-369)  K/mm3


 


MPV   (9.4-12.3)  fl


 


Neutrophils % (Manual)   (40-60)  %


 


Band Neutrophils %   (0-10)  %


 


Lymphocytes % (Manual)   (20-40)  %


 


Atypical Lymphs %   %


 


Monocytes % (Manual)   (2-10)  %


 


Eosinophils % (Manual)   (0.7-5.8)  %


 


Basophils % (Manual)   (0.1-1.2)  


 


Platelet Estimate   


 


Plt Morphology Comment   


 


RBC Morph Comment   


 


PT   (9.5-12.1)  SECONDS


 


INR   


 


Sodium   (136-145)  mEq/L


 


Potassium   (3.5-5.1)  mEq/L


 


Chloride   ()  mEq/L


 


Carbon Dioxide   (21-32)  mEq/L


 


Anion Gap   (5-15)  


 


BUN   (7-18)  mg/dL


 


Creatinine   (0.55-1.02)  mg/dL


 


Est Cr Clr Drug Dosing   mL/min


 


Estimated GFR (MDRD)   (>60)  mL/min


 


BUN/Creatinine Ratio   (14-18)  


 


Glucose   ()  mg/dL


 


Calcium   (8.5-10.1)  mg/dL


 


Magnesium   (1.8-2.4)  mg/dl


 


Total Bilirubin   (0.2-1.0)  mg/dL


 


AST   (15-37)  U/L


 


ALT   (14-59)  U/L


 


Alkaline Phosphatase   ()  U/L


 


CK-MB (CK-2)   (0-3.6)  ng/ml


 


Troponin I   (0.00-0.056)  ng/mL


 


NT-Pro-B Cindy Pep  56040 H  (0-125)  pg/mL


 


Total Protein   (6.4-8.2)  g/dl


 


Albumin   (3.4-5.0)  g/dl


 


Globulin   gm/dL


 


Albumin/Globulin Ratio   (1-2)  











Meds: 


Medications














Discontinued Medications














Generic Name Dose Route Start Last Admin





  Trade Name Freq  PRN Reason Stop Dose Admin


 


Albuterol/Ipratropium  3 ml  01/04/19 00:53  01/04/19 01:19





  Duoneb 3.0-0.5 Mg/3 Ml  NEB  01/04/19 00:54  3 ml





  ONETIME ONE   Administration





     





     





     





     


 


Albuterol/Ipratropium  3 ml  01/04/19 02:40  01/04/19 02:53





  Duoneb 3.0-0.5 Mg/3 Ml  NEB  01/04/19 02:41  3 ml





  ONETIME ONE   Administration





     





     





     





     


 


Albuterol/Ipratropium  Confirm  01/04/19 02:51  





  Duoneb 3.0-0.5 Mg/3 Ml  Administered  01/04/19 02:52  





  Dose   





  3 ml   





  .ROUTE   





  .STK-MED ONE   





     





     





     





     


 


Furosemide  40 mg  01/04/19 00:53  01/04/19 01:10





  Lasix  PO  01/04/19 00:54  40 mg





  ONETIME ONE   Administration





     





     





     





     














- Radiology Interpretation


Free Text/Narrative:: 


68-year-old female presents to the ED after being discharged from [facility] yesterday. I had seen her through the ED on December 30 after she 

returned from [place]. He presented to the ED with severe dyspnea at that 

time. Her BNP at that time was 580. She ruled in for a myocardial infarction 

with a troponin of 1.786 and a CK-MB fraction of 10.7. She was thus sent to 

[facility] for reevaluation and potential angiography. She 

reports that angiogram was done and no significant coronary disease was 

identified. Discharged yesterday. She presents to the ED tonight due to 

increasing dyspnea since getting home. Roxanol cough bringing up brownish 

secretions. She of course has stopped smoking. He states she simply can't get 

her breath tonight. O2 sats are 92-94% on room air. Does have underlying 

significant COPD. Likely she sounds wet. Plan 1 view chest x-ray. Repeat ECG. 

Repeat labs including cardiac markers and BNP.








- Re-Assessments/Exams


Free Text/Narrative Re-Assessment/Exam: 





01/04/19 01:55  chest x-ray reveals borderline cardiomegaly. No effusions. 

There is perhaps very mild diffuse vascular congestion pattern. Both pulmonary 

arteries are easily visible.Labs reveal a normal white count at 10.67. 

Differential pending hemoglobin is 13.0 with hematocrit of 38.0. Platelet 

counts 406,000. PT is 10.1 with an INR of 0.93. He should states that the 

initial treatment with DuoNeb did seem to help alleviate some of her dyspnea.





01/04/19 02:20 Chemistry is now back. Sodium is 137 with potassium of 4.4. 

Chloride was 98 with a bicarbonate 30. Anion gap is 13.4. B1 is 20. Creatinine 

is 0.8. BUN/creatinine ratio is 25. Glucose is 129. Calcium is 9.7. Magnesium 

is 1.9. Total bilirubin is 0.3. AST is 29 with an ALT of 78. Alk phosphatase 

113. CK-MB fraction is 2.1. Troponin I is 0.136. BNP is now 23,563. Total 

protein is 6.9 with an albumin fraction of 3.0.





01/04/19 02:30: Patient doesn't wish to come back into the hospital. She doesn'

t wish to be transferred back to [place] either. She has changed her CODE 

STATUS to DO NOT RESUSCITATE. He does not wish to go through any more 

aggressive treatments. Therefore I`m going to place her on Lasix 40 mg twice 

daily morning  and mid afternoon for the next 5-6 days until she sees Dr. Ramos ,

I believe on Thursday next week. She is to check  her weight on a daily basis. 

I will also place her on Slow-K 20 mEq BID . I will get records from [place] to 

identify what is change to precipitate acute congestive failure. Suspect recent 

addition of beta blocker may be a confounding factor.





01/04/19 04:10  rigors have not completely come back from [facility] but they 

indicate that the patient did enter cardiogenic shock and shortly after 

admission to the hospital December 30 and was transferred to the ICU. She was 

intubated and bedside ultrasound suggested diffuse apical akinesis. She did 

have therefore an angiogram done on the urgent basis. However the coronary 

arteries proved to be negative for any significant lesions. Therefore her 

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 

syndrome which happens to women in her age group. Hospital precipitated by the 

stress of COPD and travel. She was felt to recover substantially to be 

discharged from the hospital yesterday. However BNP of 23,000 suggest that she 

has significant left ventricular dysfunction and worsening cardiomyopathy 

symptoms. Since she did not wish to come back to the hospital and did not have 

much options other than to treat her with Lasix as an outpatient and she will 

have a nebulizer machine to take home to use DuoNeb 3 times daily for her COPD 

. The question is whether or not she should start carvedilol 6.25 mg twice 

daily as this may cause further left ventricular weakness. Will consult with 

cardiology at [facility].





01/04/19 06:43 I did speak with --cardiologist at [facility]. He was aware 

of the patient and her abnormalities. He suggest that she continue with low-

dose diuretic 20-40 mg per day and indicated that she was on the carvedilol 

6.25 mg while she was in the hospital. He wishes her to continue with this 

medication. He indicates that the elevated BNP is natural due to her underlying 

cardiomyopathy and felt that it will slowly improve with time. I will let the 

patient know of the findings in the need to continue Lasix just once daily 

every morning with potassium supplement once daily as well.








Departure





- Departure


Time of Disposition: 02:48


Disposition: Home, Self-Care 01


Condition: Fair


Clinical Impression: 


Congestive heart failure


Qualifiers:


 Heart failure type: unspecified Heart failure chronicity: acute Qualified Code(

s): I50.9 - Heart failure, unspecified








- Discharge Information


*PRESCRIPTION DRUG MONITORING PROGRAM REVIEWED*: Not Applicable


*COPY OF PRESCRIPTION DRUG MONITORING REPORT IN PATIENT JUDSON: Not Applicable


Prescriptions: 


Albuterol/Ipratropium [DuoNeb 3.0-0.5 MG/3 ML] 3 ml NEB TID PRN #100 neb


 PRN Reason: Shortness of breath/wheezing


Furosemide [Lasix] 40 mg PO BID #60 tab


Potassium Chloride 20 meq PO BID #60 tablet.er


Instructions:  Heart Failure, Easy-to-Read


Referrals: 


PCP,Not In Area [Primary Care Provider] - 


Forms:  ED Department Discharge


Additional Instructions: 


Evaluation in the emergency room this morning due to increased shortness of 

breath since you're discharged from hospital yesterday. He was sent to [facility] because of elevated cardiac markers suggesting acute myocardial 

infarction when you were seen on December 30. However apparently angiogram did 

not identify any significant coronary disease that required stenting. However 

on today's assessment in the ED you identified to be in significant congestive 

heart failure which is new onset for you. This is making you short of breath at 

rest and certainly on minimal exertion. Treatment is water pill Lasix 840 mg in 

the morning and 40 mg mid afternoon between 2 and 3:00 for the next 6 days 

until you follow up with Dr. Ramos at [facility]. Please step on the scale 

first thing every morning and weigh yourself and right the weights down so we 

know how much fluid you lose. He will also need to take a potassium supplement 

20 mEq twice daily due to the water pill causing the kidneys to waste 

potassium. You may use your nebulizer machine which we arranged through the ED 

tonight as often as needed. Medication is DuoNeb 1 every 8 hours as needed for 

relief of shortness of breath and/or wheezing. Her oxygen will come up on its 

own after you get rid of a lot of the fluid that is building up in your lungs.





- My Orders


Last 24 Hours: 


My Active Orders





01/04/19 00:51


EKG Documentation Completion [RC] STAT 


Chest 1V Frontal [CR] Stat 





01/04/19 00:52


Oxygen Therapy [RC] ASDIRECTED 





01/04/19 00:53


RT Aerosol Therapy [RC] ASDIRECTED 





01/04/19 02:40


RT Aerosol Therapy [RC] ASDIRECTED 














- Assessment/Plan


Last 24 Hours: 


My Active Orders





01/04/19 00:51


EKG Documentation Completion [RC] STAT 


Chest 1V Frontal [CR] Stat 





01/04/19 00:52


Oxygen Therapy [RC] ASDIRECTED 





01/04/19 00:53


RT Aerosol Therapy [RC] ASDIRECTED 





01/04/19 02:40


RT Aerosol Therapy [RC] ASDIRECTED

## 2019-01-04 NOTE — CR
Chest: Frontal view of the chest was obtained.

 

Comparison: Prior chest x-ray of 12/30/18.

 

Heart size is normal.  Tortuous thoracic aorta is seen.  Lungs are 

clear without acute parenchymal change.  Bony structures are grossly 

intact.  Incidental note of old healed left clavicle fracture.

 

Impression:

1.  Nothing acute is seen on frontal chest x-ray.

 

Diagnostic code #2

## 2019-01-27 ENCOUNTER — HOSPITAL ENCOUNTER (EMERGENCY)
Dept: HOSPITAL 41 - JD.ED | Age: 69
Discharge: OTHER INSTITUTION | End: 2019-01-27
Payer: MEDICARE

## 2019-01-27 DIAGNOSIS — Z79.899: ICD-10-CM

## 2019-01-27 DIAGNOSIS — I25.2: ICD-10-CM

## 2019-01-27 DIAGNOSIS — Z87.891: ICD-10-CM

## 2019-01-27 DIAGNOSIS — Z79.82: ICD-10-CM

## 2019-01-27 DIAGNOSIS — E86.0: ICD-10-CM

## 2019-01-27 DIAGNOSIS — R55: Primary | ICD-10-CM

## 2019-01-27 DIAGNOSIS — E87.6: ICD-10-CM

## 2019-01-27 DIAGNOSIS — Z87.440: ICD-10-CM

## 2019-01-27 PROCEDURE — 96375 TX/PRO/DX INJ NEW DRUG ADDON: CPT

## 2019-01-27 PROCEDURE — 96366 THER/PROPH/DIAG IV INF ADDON: CPT

## 2019-01-27 PROCEDURE — 84132 ASSAY OF SERUM POTASSIUM: CPT

## 2019-01-27 PROCEDURE — 83735 ASSAY OF MAGNESIUM: CPT

## 2019-01-27 PROCEDURE — 83690 ASSAY OF LIPASE: CPT

## 2019-01-27 PROCEDURE — 80053 COMPREHEN METABOLIC PANEL: CPT

## 2019-01-27 PROCEDURE — 93005 ELECTROCARDIOGRAM TRACING: CPT

## 2019-01-27 PROCEDURE — 71045 X-RAY EXAM CHEST 1 VIEW: CPT

## 2019-01-27 PROCEDURE — 84484 ASSAY OF TROPONIN QUANT: CPT

## 2019-01-27 PROCEDURE — 99285 EMERGENCY DEPT VISIT HI MDM: CPT

## 2019-01-27 PROCEDURE — 85025 COMPLETE CBC W/AUTO DIFF WBC: CPT

## 2019-01-27 PROCEDURE — 81001 URINALYSIS AUTO W/SCOPE: CPT

## 2019-01-27 PROCEDURE — 36415 COLL VENOUS BLD VENIPUNCTURE: CPT

## 2019-01-27 PROCEDURE — 96365 THER/PROPH/DIAG IV INF INIT: CPT

## 2019-01-27 NOTE — CR
Chest: Portable view of the chest was obtained.

 

Comparison: Prior chest x-ray of 01/04/19 and 12/30/18.

 

Slightly prominent epicardial fat pad is noted within the right 

cardiophrenic angle.  Lungs are clear with no acute parenchymal 

change.  Heart size and mediastinum are normal.  Bony structures are 

osteopenic.  Old healed left clavicle fracture is noted.

 

Impression:

1.  Incidental findings.  Nothing acute is appreciated.

 

Diagnostic code #2

## 2019-01-27 NOTE — EDM.PDOC
ED HPI GENERAL MEDICAL PROBLEM





- General


Chief Complaint: Syncope


Stated Complaint: [facility]


Time Seen by Provider: 01/27/19 10:19


Source of Information: Reports: Patient


History Limitations: Reports: No Limitations





- History of Present Illness


INITIAL COMMENTS - FREE TEXT/NARRATIVE: 





The patient is a 68-year-old female with a history of recent Takotsubo 

cardiomyopathy brought in by ambulance after an episode of syncope at home. She 

states that recently her Lasix dose was cut in half and her cardiologist 

started carvedilol 3.125 mg twice a day a few days ago. Since starting the 

carvedilol, she has felt vaguely weak. She has been taking it as prescribed. 

States her blood pressure at home has been running low, at its lowest 90s over 

40s, at its highest systolics in the 120s. Last evening she had several 

alcoholic beverages with family. This morning she woke up and had 3 episodes of 

vomiting at about 6:30 AM. Later in the morning when she tried to get up and go 

to get a drink of water she felt very dizzy and weak and then passed out. She 

was with a family member. There is no significant injury. She then tried to get 

up again and again passed out just for a few seconds. Family member describes 

her as having eyes open but being unresponsive, she was still breathing 

normally. There was no seizure-like activity. EMS was called. She now feels 

vaguely weak but has no additional complaint. Mild headache, no additional 

neurological complaint. No chest pain, palpitations, or shortness of breath. No 

vomiting or diarrhea since this morning's episode of vomiting. No urinary 

symptoms denies lower extremity pain or swelling. No recent travel or 

immobilization.





- Related Data


 Allergies











Allergy/AdvReac Type Severity Reaction Status Date / Time


 


No Known Allergies Allergy   Verified 01/27/19 10:19











Home Meds: 


 Home Meds





LORazepam [Ativan] 1 mg PO TID PRN 12/30/18 [History]


Zolpidem [Ambien] 10 mg PO BEDTIME 12/30/18 [History]


clonazePAM [Klonopin] 0.5 mg PO BID PRN 12/30/18 [History]


Aspirin 81 mg PO DAILY 01/04/19 [History]


Benzonatate 100 mg PO TID 01/04/19 [History]


Carvedilol 3.125 mg PO BID 01/04/19 [History]


Montelukast [Singulair] 10 mg PO BEDTIME 01/04/19 [History]


Nicotine [Nicotine Patch] 1 patch TOP DAILY 01/04/19 [History]


predniSONE [Prednisone] 0 mg PO ASDIRECTED 01/04/19 [History]


Albuterol/Ipratropium [DuoNeb 3.0-0.5 MG/3 ML] 3 ml NEB BID 01/27/19 [History]


Docusate Sodium [Dss] 1 cap PO BEDTIME 01/27/19 [History]


Furosemide [Lasix] 20 mg PO DAILY 01/27/19 [History]


Olmesartan/Hydrochlorothiazide [Benicar HCT 20-12.5 MG] 1 tab PO DAILY 01/27/19 

[History]


PARoxetine HCl [Paroxetine HCl] 40 mg PO DAILY 01/27/19 [History]


Potassium Chloride 10 meq PO DAILY 01/27/19 [History]











Past Medical History


HEENT History: Reports: Impaired Vision


Other HEENT History: wears eyeglasses.


Cardiovascular History: Reports: Cardiomyopathy, MI, Pulmonary Hypertension


Respiratory History: Reports: Bronchitis, Recurrent, COPD, SOB, Other (See Below

)


Other Respiratory History: allergies


Genitourinary History: Reports: UTI, Recurrent


OB/GYN History: Reports: Pregnancy


Musculoskeletal History: Reports: Fracture


Psychiatric History: Reports: Anxiety, Depression





- Infectious Disease History


Infectious Disease History: Reports: Chicken Pox, Measles, Mumps





Social & Family History





- Tobacco Use


Smoking Status *Q: Former Smoker


Years of Tobacco use: 45


Packs/Tins Daily: 0.5


Used Tobacco, but Quit: Yes


Month/Year Tobacco Last Used: Dec 2018


Second Hand Smoke Exposure: No





- Caffeine Use


Caffeine Use: Reports: Coffee





- Recreational Drug Use


Recreational Drug Use: No





- Living Situation & Occupation


Living situation: Reports: 


Occupation: Retired





ED ROS GENERAL





- Review of Systems


Review Of Systems: See Below


Constitutional: Denies: Fever


HEENT: Reports: No Symptoms


Respiratory: Denies: Shortness of Breath


Cardiovascular: Denies: Chest Pain


Endocrine: Reports: No Symptoms


GI/Abdominal: Reports: Vomiting.  Denies: Abdominal Pain


: Reports: No Symptoms


Musculoskeletal: Reports: No Symptoms


Skin: Reports: No Symptoms


Neurological: Reports: Headache, Syncope


Psychiatric: Reports: No Symptoms





- Physical Exam


Exam: See Below


Exam Limited By: No Limitations


General Appearance: Alert, WD/WN, No Apparent Distress


Eye Exam: Bilateral Eye: Normal Inspection


Ears: Normal External Exam


Nose: Normal Inspection


Throat/Mouth: Normal Inspection, Normal Voice


Head Exam: Atraumatic, Normocephalic


Neck: Normal Inspection


Respiratory/Chest: No Respiratory Distress, Lungs Clear, Normal Breath Sounds


Cardiovascular: Normal Peripheral Pulses, Regular Rate, Rhythm, No Edema


GI/Abdominal: Soft, Non-Tender, No Distention.  No: Rebound


Neuro Exam (Abbreviated): Alert, Oriented, CN II-XII Intact, Normal Cognition, 

No Motor/Sensory Deficits


Extremities: Normal Inspection.  No: Pedal Edema, Leg Pain


Psychiatric: Normal Affect, Normal Mood


Skin Exam: Warm, Dry, Intact, Normal Color, No Rash





Course





- Vital Signs


Last Recorded V/S: 


 Last Vital Signs











Temp  36.7 C   01/27/19 15:10


 


Pulse  90   01/27/19 15:10


 


Resp  20   01/27/19 15:10


 


BP  119/61   01/27/19 15:10


 


Pulse Ox  95   01/27/19 15:10














- Orders/Labs/Meds


Orders: 


 Active Orders 24 hr











 Category Date Time Status


 


 EKG 12 Lead [EKG Documentation Completion] [RC] STAT Care  01/27/19 12:34 

Active


 


 Peripheral IV Care [RC] .AS DIRECTED Care  01/27/19 10:19 Active


 


 Peripheral IV Care [RC] .AS DIRECTED Care  01/27/19 10:19 Active


 


 Peripheral IV Insertion Adult [OM.PC] Routine Oth  01/27/19 10:19 Ordered











Labs: 


 Laboratory Tests











  01/27/19 01/27/19 01/27/19 Range/Units





  10:35 10:35 12:10 


 


WBC  12.75 H    (3.98-10.04)  K/mm3


 


RBC  4.50    (3.98-5.22)  M/mm3


 


Hgb  13.9    (11.2-15.7)  gm/L


 


Hct  40.1    (34.1-44.9)  %


 


MCV  89.1    (79.4-94.8)  fl


 


MCH  30.9    (25.6-32.2)  pg


 


MCHC  34.7    (32.2-35.5)  g/dl


 


RDW Std Deviation  43.7    (36.4-46.3)  fL


 


Plt Count  283    (182-369)  K/mm3


 


MPV  9.5    (9.4-12.3)  fl


 


Neut % (Auto)  71.8 H    (34.0-71.1)  %


 


Lymph % (Auto)  18.6 L    (19.3-51.7)  %


 


Mono % (Auto)  6.4    (4.7-12.5)  %


 


Eos % (Auto)  2.4    (0.7-5.8)  


 


Baso % (Auto)  0.3    (0.1-1.2)  %


 


Neut # (Auto)  9.15 H    (1.56-6.13)  K/mm3


 


Lymph # (Auto)  2.37    (1.18-3.74)  K/mm3


 


Mono # (Auto)  0.81 H    (0.24-0.36)  K/mm3


 


Eos # (Auto)  0.31    (0.04-0.36)  K/mm3


 


Baso # (Auto)  0.04    (0.01-0.08)  K/mm3


 


Sodium   134 L   (136-145)  mEq/L


 


Potassium   2.7 L   (3.5-5.1)  mEq/L


 


Chloride   92 L   ()  mEq/L


 


Carbon Dioxide   31   (21-32)  mEq/L


 


Anion Gap   13.7   (5-15)  


 


BUN   26 H   (7-18)  mg/dL


 


Creatinine   1.0   (0.55-1.02)  mg/dL


 


Est Cr Clr Drug Dosing   47.79   mL/min


 


Estimated GFR (MDRD)   55   (>60)  mL/min


 


BUN/Creatinine Ratio   26.0 H   (14-18)  


 


Glucose   99   ()  mg/dL


 


Calcium   9.4   (8.5-10.1)  mg/dL


 


Magnesium   1.8   (1.8-2.4)  mg/dl


 


Total Bilirubin   0.4   (0.2-1.0)  mg/dL


 


AST   15   (15-37)  U/L


 


ALT   22   (14-59)  U/L


 


Alkaline Phosphatase   60   ()  U/L


 


Troponin I   < 0.017   (0.00-0.056)  ng/mL


 


Total Protein   6.6   (6.4-8.2)  g/dl


 


Albumin   3.4   (3.4-5.0)  g/dl


 


Globulin   3.2   gm/dL


 


Albumin/Globulin Ratio   1.1   (1-2)  


 


Lipase   98   ()  U/L


 


Urine Color    Yellow  (Yellow)  


 


Urine Appearance    Clear  (Clear)  


 


Urine pH    5.5  (5.0-8.0)  


 


Ur Specific Gravity    1.020  (1.005-1.030)  


 


Urine Protein    Negative  (Negative)  


 


Urine Glucose (UA)    Negative  (Negative)  


 


Urine Ketones    Negative  (Negative)  


 


Urine Occult Blood    2+ H  (Negative)  


 


Urine Nitrite    Negative  (Negative)  


 


Urine Bilirubin    Negative  (Negative)  


 


Urine Urobilinogen    0.2  (0.2-1.0)  


 


Ur Leukocyte Esterase    Negative  (Negative)  


 


Urine RBC    10-20 H  (0-5)  /hpf


 


Urine WBC    0-5  (0-5)  /hpf


 


Ur Epithelial Cells    0-5  (0-5)  /hpf


 


Urine Bacteria    Moderate H  (FEW)  /hpf


 


Hyaline Casts    5-10 H  (0-5)  /lpf


 


Urine Mucus    Moderate H  (FEW)  /hpf














  01/27/19 01/27/19 Range/Units





  12:57 14:25 


 


WBC    (3.98-10.04)  K/mm3


 


RBC    (3.98-5.22)  M/mm3


 


Hgb    (11.2-15.7)  gm/L


 


Hct    (34.1-44.9)  %


 


MCV    (79.4-94.8)  fl


 


MCH    (25.6-32.2)  pg


 


MCHC    (32.2-35.5)  g/dl


 


RDW Std Deviation    (36.4-46.3)  fL


 


Plt Count    (182-369)  K/mm3


 


MPV    (9.4-12.3)  fl


 


Neut % (Auto)    (34.0-71.1)  %


 


Lymph % (Auto)    (19.3-51.7)  %


 


Mono % (Auto)    (4.7-12.5)  %


 


Eos % (Auto)    (0.7-5.8)  


 


Baso % (Auto)    (0.1-1.2)  %


 


Neut # (Auto)    (1.56-6.13)  K/mm3


 


Lymph # (Auto)    (1.18-3.74)  K/mm3


 


Mono # (Auto)    (0.24-0.36)  K/mm3


 


Eos # (Auto)    (0.04-0.36)  K/mm3


 


Baso # (Auto)    (0.01-0.08)  K/mm3


 


Sodium    (136-145)  mEq/L


 


Potassium   3.8  (3.5-5.1)  mEq/L


 


Chloride    ()  mEq/L


 


Carbon Dioxide    (21-32)  mEq/L


 


Anion Gap    (5-15)  


 


BUN    (7-18)  mg/dL


 


Creatinine    (0.55-1.02)  mg/dL


 


Est Cr Clr Drug Dosing    mL/min


 


Estimated GFR (MDRD)    (>60)  mL/min


 


BUN/Creatinine Ratio    (14-18)  


 


Glucose    ()  mg/dL


 


Calcium    (8.5-10.1)  mg/dL


 


Magnesium    (1.8-2.4)  mg/dl


 


Total Bilirubin    (0.2-1.0)  mg/dL


 


AST    (15-37)  U/L


 


ALT    (14-59)  U/L


 


Alkaline Phosphatase    ()  U/L


 


Troponin I  < 0.017   (0.00-0.056)  ng/mL


 


Total Protein    (6.4-8.2)  g/dl


 


Albumin    (3.4-5.0)  g/dl


 


Globulin    gm/dL


 


Albumin/Globulin Ratio    (1-2)  


 


Lipase    ()  U/L


 


Urine Color    (Yellow)  


 


Urine Appearance    (Clear)  


 


Urine pH    (5.0-8.0)  


 


Ur Specific Gravity    (1.005-1.030)  


 


Urine Protein    (Negative)  


 


Urine Glucose (UA)    (Negative)  


 


Urine Ketones    (Negative)  


 


Urine Occult Blood    (Negative)  


 


Urine Nitrite    (Negative)  


 


Urine Bilirubin    (Negative)  


 


Urine Urobilinogen    (0.2-1.0)  


 


Ur Leukocyte Esterase    (Negative)  


 


Urine RBC    (0-5)  /hpf


 


Urine WBC    (0-5)  /hpf


 


Ur Epithelial Cells    (0-5)  /hpf


 


Urine Bacteria    (FEW)  /hpf


 


Hyaline Casts    (0-5)  /lpf


 


Urine Mucus    (FEW)  /hpf











Meds: 


Medications














Discontinued Medications














Generic Name Dose Route Start Last Admin





  Trade Name Freq  PRN Reason Stop Dose Admin


 


Acetaminophen  325 mg  01/27/19 14:16  01/27/19 14:40





  Tylenol  PO  01/27/19 14:17  325 mg





  NOW ONE   Administration





     





     





     





     


 


Potassium Chloride 10 meq/  100 mls @ 100 mls/hr  01/27/19 11:15  01/27/19 13:54





  Premix  IV  01/27/19 15:14  100 mls/hr





  Q1H RAQUEL   Administration





     





     





     





     


 


Ketorolac Tromethamine  10 mg  01/27/19 11:40  01/27/19 11:49





  Toradol  IVPUSH  01/27/19 11:41  10 mg





  ONETIME ONE   Administration





     





     





     





     


 


Potassium Chloride  40 meq  01/27/19 11:14  01/27/19 11:48





  Potassium Chloride Solution  PO  01/27/19 11:15  40 meq





  ONETIME ONE   Administration





     





     





     





     


 


Sodium Chloride  10 ml  01/27/19 10:19  01/27/19 10:35





  Saline Flush  FLUSH   10 ml





  ASDIRECTED PRN   Administration





  Keep Vein Open   





     





     





     














- Re-Assessments/Exams


Free Text/Narrative Re-Assessment/Exam: 





01/27/19 10:53


EKG shows normal sinus rhythm, lateral T-wave inversions, no ST segment 

abnormality.


01/27/19 11:45


White blood cell count 12. Troponin negative. Mild hyponatremia with sodium of 

134, moderate hypokalemia with potassium of 2.7. She is on Lasix. She does take 

supplemental potassium tablets. Her Lasix dose and her potassium tablet dose 

were both cut in half at her cardiology appointment last week. Suspect 

hypokalemia due to combination of diuretic and vomiting this morning. She is 

also taking hydrochlorothiazide. We will plan to replete her potassium and 

observe her here in the emergency department for a few hours. Meanwhile her 

blood pressures here have been soft but normal. I think that she may be 

overmedicated. Beta blocker was recently started, presumably for cardiac 

protection rather than blood pressure control. Because it's over the weekend 

and I can't speak directly with her cardiologist, I will plan to advised her to 

discontinue her Benicar for now, continue furosemide but at a lower dose, 

continue potassium supplementation (her renal function is normal), continue 

beta blocker but hold for low blood pressure, and for her to consult with her 

cardiologist tomorrow for further care. She would like to go home. She did well 

when nursing staff got her up and moving. 


01/27/19 16:57








Departure





- Departure


Time of Disposition: 15:06


Disposition: Refer to Observation


Clinical Impression: 


 Syncope and collapse, Hypokalemia, Dehydration, mild








- Discharge Information


Instructions:  Hypokalemia, Dehydration, Adult, Easy-to-Read, Syncope, Easy-to-

Read


Referrals: 


PCP,Not In Area [Primary Care Provider] - 


Forms:  ED Department Discharge


Additional Instructions: 


1. Drink a normal amount of fluid. Avoid drinking more than 1/2 alcoholic 

beverages at a time as this will make you dehydrated. Fluid balance is very 

important given your heart condition. 


2. Take a full tablet of your potassium daily. 


3. Discontinue Benicar for now. Call your cardiologist tomorrow morning to 

advise that you were in the Emergency Department with passing out episode, low 

blood pressure, and low potassium. Further medication adjustments should be 

managed by your cardiologist. Keep a blood pressure log.


4. Don't take your furosemide (lasix) tomorrow either. Starting Tuesday, 

restart it but take only 1/4 tab daily unless advised differently by your 

cardiologist. 


5. Don't take the carvedilol unless your top number of your blood pressure is 

at least 110. 


6. Return to the ED if you have chest pain, difficulty breathing, passing out 

episodes, or other concerning symptoms. 





- My Orders


Last 24 Hours: 


My Active Orders





01/27/19 10:19


Peripheral IV Care [RC] .AS DIRECTED 


Peripheral IV Care [RC] .AS DIRECTED 


Peripheral IV Insertion Adult [OM.PC] Routine 





01/27/19 12:34


EKG 12 Lead [EKG Documentation Completion] [RC] STAT 














- Assessment/Plan


Last 24 Hours: 


My Active Orders





01/27/19 10:19


Peripheral IV Care [RC] .AS DIRECTED 


Peripheral IV Care [RC] .AS DIRECTED 


Peripheral IV Insertion Adult [OM.PC] Routine 





01/27/19 12:34


EKG 12 Lead [EKG Documentation Completion] [RC] STAT

## 2019-03-05 ENCOUNTER — HOSPITAL ENCOUNTER (INPATIENT)
Dept: HOSPITAL 41 - JD.ED | Age: 69
LOS: 1 days | Discharge: NURSING, REHAB OR LONG-TERM CARE | DRG: 194 | End: 2019-03-06
Attending: INTERNAL MEDICINE | Admitting: INTERNAL MEDICINE
Payer: MEDICARE

## 2019-03-05 DIAGNOSIS — R06.00: ICD-10-CM

## 2019-03-05 DIAGNOSIS — H54.7: ICD-10-CM

## 2019-03-05 DIAGNOSIS — R53.1: ICD-10-CM

## 2019-03-05 DIAGNOSIS — F41.9: ICD-10-CM

## 2019-03-05 DIAGNOSIS — R51: ICD-10-CM

## 2019-03-05 DIAGNOSIS — J10.1: Primary | ICD-10-CM

## 2019-03-05 DIAGNOSIS — R53.83: ICD-10-CM

## 2019-03-05 DIAGNOSIS — F32.9: ICD-10-CM

## 2019-03-05 DIAGNOSIS — Z79.82: ICD-10-CM

## 2019-03-05 DIAGNOSIS — I51.81: ICD-10-CM

## 2019-03-05 DIAGNOSIS — J44.1: ICD-10-CM

## 2019-03-05 DIAGNOSIS — I27.20: ICD-10-CM

## 2019-03-05 DIAGNOSIS — Z79.899: ICD-10-CM

## 2019-03-05 DIAGNOSIS — I42.9: ICD-10-CM

## 2019-03-05 DIAGNOSIS — R74.8: ICD-10-CM

## 2019-03-05 DIAGNOSIS — I50.9: ICD-10-CM

## 2019-03-05 DIAGNOSIS — R53.81: ICD-10-CM

## 2019-03-05 DIAGNOSIS — R05: ICD-10-CM

## 2019-03-05 DIAGNOSIS — R41.0: ICD-10-CM

## 2019-03-05 DIAGNOSIS — I25.2: ICD-10-CM

## 2019-03-05 DIAGNOSIS — R09.02: ICD-10-CM

## 2019-03-05 DIAGNOSIS — Z87.440: ICD-10-CM

## 2019-03-05 DIAGNOSIS — I27.81: ICD-10-CM

## 2019-03-05 DIAGNOSIS — Z87.891: ICD-10-CM

## 2019-03-05 DIAGNOSIS — R06.89: ICD-10-CM

## 2019-03-05 DIAGNOSIS — R06.03: ICD-10-CM

## 2019-03-05 DIAGNOSIS — R06.82: ICD-10-CM

## 2019-03-05 DIAGNOSIS — R06.2: ICD-10-CM

## 2019-03-05 DIAGNOSIS — R42: ICD-10-CM

## 2019-03-05 DIAGNOSIS — R06.02: ICD-10-CM

## 2019-03-05 DIAGNOSIS — K59.00: ICD-10-CM

## 2019-03-05 PROCEDURE — 99285 EMERGENCY DEPT VISIT HI MDM: CPT

## 2019-03-05 PROCEDURE — 83880 ASSAY OF NATRIURETIC PEPTIDE: CPT

## 2019-03-05 PROCEDURE — 83735 ASSAY OF MAGNESIUM: CPT

## 2019-03-05 PROCEDURE — 85610 PROTHROMBIN TIME: CPT

## 2019-03-05 PROCEDURE — 71045 X-RAY EXAM CHEST 1 VIEW: CPT

## 2019-03-05 PROCEDURE — 82803 BLOOD GASES ANY COMBINATION: CPT

## 2019-03-05 PROCEDURE — 36415 COLL VENOUS BLD VENIPUNCTURE: CPT

## 2019-03-05 PROCEDURE — 86738 MYCOPLASMA ANTIBODY: CPT

## 2019-03-05 PROCEDURE — 80053 COMPREHEN METABOLIC PANEL: CPT

## 2019-03-05 PROCEDURE — 87804 INFLUENZA ASSAY W/OPTIC: CPT

## 2019-03-05 PROCEDURE — 86140 C-REACTIVE PROTEIN: CPT

## 2019-03-05 PROCEDURE — 85379 FIBRIN DEGRADATION QUANT: CPT

## 2019-03-05 PROCEDURE — 96374 THER/PROPH/DIAG INJ IV PUSH: CPT

## 2019-03-05 PROCEDURE — 85027 COMPLETE CBC AUTOMATED: CPT

## 2019-03-05 PROCEDURE — 82553 CREATINE MB FRACTION: CPT

## 2019-03-05 PROCEDURE — 85007 BL SMEAR W/DIFF WBC COUNT: CPT

## 2019-03-05 PROCEDURE — 36600 WITHDRAWAL OF ARTERIAL BLOOD: CPT

## 2019-03-05 PROCEDURE — 84484 ASSAY OF TROPONIN QUANT: CPT

## 2019-03-05 PROCEDURE — 85652 RBC SED RATE AUTOMATED: CPT

## 2019-03-05 PROCEDURE — 93005 ELECTROCARDIOGRAM TRACING: CPT

## 2019-03-05 PROCEDURE — 87899 AGENT NOS ASSAY W/OPTIC: CPT

## 2019-03-05 PROCEDURE — 94640 AIRWAY INHALATION TREATMENT: CPT

## 2019-03-05 NOTE — EDM.PDOC
ED HPI GENERAL MEDICAL PROBLEM





- General


Chief Complaint: Respiratory Problem


Stated Complaint: SOB


Time Seen by Provider: 03/05/19 14:10


Source of Information: Reports: Patient, Family (son)


History Limitations: Reports: No Limitations





- History of Present Illness


INITIAL COMMENTS - FREE TEXT/NARRATIVE: 





68-year-old female presents to the ED with chief complaint of significant 

dyspnea on minimal exertion. Her son who stays with her indicates that she was 

not acting normally this morning she was doing automobile bizarre things. 

Tegretol she was drunk and very short of breath. She has a productive sounding 

cough but is not able to get anything up. No hemoptysis appreciated. No fever 

or chills. She started on steroid just today 20 mg of prednisone twice a day. 

His home oxygen therapy she was scheduled for lung function studies today to 

see if she needed oxygen. O2 sats in the ED were 86% and up to 96% on 2 L. She 

had a severe pounding throbbing headache I suspect due to hypercarbia because 

it improved after she was started on oxygen therapy. Very poor the last 2-3 

days. And eyes any chest pain. She states her feet are not swelling any worse 

than normal. She has taken 3 DuoNeb's in the last 8 hours without any real 

improvement in her ability to breathe. Short of breath on exertion and actually 

worsened by lying down.


Onset: Gradual


Onset Date: 03/02/19


Duration: Day(s):, Getting Worse


Location: Reports: Chest (More more short of breath the last 3-4 days. 

Confusion and disorientation this morning suspect due to hypercarbia.)


Quality: Reports: Other (Dyspnea with audible wheezing and productive sounding 

cough but not able to expectorate any phlegm)


Severity: Severe


Improves with: Reports: None


Worsens with: Reports: Other


Context: Reports: Other (Has known severe COPD with an acute exacerbation.).  

Denies: Activity (Worse with lying down and with walking. Barely made it out to 

the car today to come to the hospital), Exercise, Lifting, Sick Contact, Trauma


Associated Symptoms: Reports: Confusion, Cough, cough w sputum, Headaches (Had 

a bad headache until she was started on oxygen), Loss of Appetite ( in the ED.)

, Malaise, Shortness of Breath, Weakness.  Denies: Chest Pain, Diaphoresis (But 

can't get any sputum up.), Fever/Chills, Nausea/Vomiting, Rash, Seizure


Treatments PTA: Reports: Other (see below) (DuoNeb 3 over the last 8 hours)


  ** Headache


Pain Score (Numeric/FACES): 10





- Related Data


 Allergies











Allergy/AdvReac Type Severity Reaction Status Date / Time


 


No Known Allergies Allergy   Verified 03/05/19 18:43











Home Meds: 


 Home Meds





LORazepam [Ativan] 1 mg PO TID PRN 12/30/18 [History]


Zolpidem [Ambien] 10 mg PO BEDTIME 12/30/18 [History]


Aspirin 81 mg PO DAILY 01/04/19 [History]


Carvedilol 6.25 mg PO BID 01/04/19 [History]


Montelukast [Singulair] 10 mg PO BEDTIME 01/04/19 [History]


Nicotine [Nicotine Patch] 14 mg TOP DAILY 01/04/19 [History]


Albuterol/Ipratropium [DuoNeb 3.0-0.5 MG/3 ML] 3 ml NEB QID PRN 01/27/19 [

History]


Furosemide [Lasix] 20 mg PO DAILY 01/27/19 [History]


PARoxetine HCl [Paroxetine HCl] 40 mg PO DAILY 01/27/19 [History]


Potassium Chloride 10 meq PO DAILY 01/27/19 [History]


Losartan [Cozaar] 25 mg PO DAILY 03/05/19 [History]


atorvaSTATin [Lipitor] 10 mg PO DAILY 03/05/19 [History]











Past Medical History


HEENT History: Reports: Impaired Vision


Other HEENT History: wears eyeglasses.


Cardiovascular History: Reports: Cardiomyopathy, MI, Pulmonary Hypertension, 

Other (See Below) (Had broken heart syndrome when she was seen in [place] 

December 30 and 31st of this last year. She had a normal and her gamma her 

heart and he felt that her elevated cardiac markers and BNP were due to cor 

pulmonale.)


Respiratory History: Reports: Bronchitis, Recurrent, COPD, SOB, Other (See Below

)


Other Respiratory History: allergies


Genitourinary History: Reports: UTI, Recurrent


OB/GYN History: Reports: Pregnancy


Musculoskeletal History: Reports: Fracture


Psychiatric History: Reports: Anxiety, Depression





- Infectious Disease History


Infectious Disease History: Reports: Chicken Pox, Measles, Mumps





Social & Family History





- Tobacco Use


Smoking Status *Q: Former Smoker


Used Tobacco, but Quit: Yes


Month/Year Tobacco Last Used: Dec 30th





- Caffeine Use


Caffeine Use: Reports: None





- Recreational Drug Use


Recreational Drug Use: No





- Living Situation & Occupation


Living situation: Reports: 


Occupation: Retired





ED ROS GENERAL





- Review of Systems


Review Of Systems: See Below


Constitutional: Reports: Malaise, Weakness, Fatigue, Decreased Appetite (Has no 

appetite), Weight Loss.  Denies: Fever, Chills


HEENT: Reports: No Symptoms


Respiratory: Reports: Shortness of Breath, Wheezing, Cough, Sputum.  Denies: 

Pleuritic Chest Pain, Hemoptysis (Sounds rattly but unable to get up any sputum)


Cardiovascular: Reports: Dyspnea on Exertion (Chronically), Lightheadedness.  

Denies: Chest Pain, Blood Pressure Problem, Claudication, Edema (Occasionally.)

, Orthopnea


Endocrine: Reports: Fatigue


GI/Abdominal: Reports: Constipation, Decreased Appetite (Mild history of 

constipation.)


: Reports: Frequency


Musculoskeletal: Reports: No Symptoms


Skin: Reports: No Symptoms


Neurological: Reports: Confusion


Psychiatric: Reports: No Symptoms (Disoriented and confused this morning better 

since being on oxygen.)


Hematologic/Lymphatic: Reports: No Symptoms


Immunologic: Reports: No Symptoms





ED EXAM, GENERAL





- Physical Exam


Exam: See Below


Exam Limited By: No Limitations


General Appearance: Alert, WD/WN, Moderate Distress (Moderate respiratory 

distress. Tachypnea at 36/m with O2 sats initially 87%. On 2 L she is up to 96%

. Her headache is also dissipated indicating hypercarbia likely is improved.)


Eye Exam: Bilateral Eye: Normal Inspection


Throat/Mouth: Normal Inspection, Normal Oropharynx, Normal Voice


Head: Atraumatic, Normocephalic


Neck: Normal Inspection, Supple, Non-Tender, Full Range of Motion.  No: 

Lymphadenopathy (L), Lymphadenopathy (R)


Respiratory/Chest: Chest Non-Tender, Respiratory Distress (Tachypnea at rest), 

Decreased Breath Sounds.  No: Lungs Clear (Decreased air entry of the lower 50% 

of lung fields bilaterally with occasional faint expiratory wheezes.), Normal 

Breath Sounds, No Accessory Muscle Use


Cardiovascular: Normal Peripheral Pulses, Regular Rate, Rhythm, No Edema (Heart 

sounds are difficult to hear think due to overlying COPD), No Gallop, No Murmur

, No Rub, Other


Peripheral Pulses: 1+: Posterior Tibial (L), Posterior Tibial (R), Dorsalis 

Pedis (L), Dorsalis Pedis (R)


GI/Abdominal: Normal Bowel Sounds, Soft, Non-Tender, No Abnormal Bruit, No Mass

, Distended (Mildly distended upper abdomen from aerophagia.)


Extremities: Normal Inspection, Normal Range of Motion, Non-Tender, No Pedal 

Edema


Neurological: Alert, Oriented, CN II-XII Intact, Normal Cognition, Normal Gait


Psychiatric: Normal Affect, Normal Mood


Skin Exam: Warm, Dry, Intact, Normal Color, No Rash





EKG INTERPRETATION


EKG Date: 03/05/19


Time: 14:45


Rhythm: NSR


Rate (Beats/Min): 84


Axis: Normal


P-Wave: Enlarged (Consider left atrial enlargement)


QRS: Other (Nonspecific intraventricular conduction delay)


ST-T: Other (Diffuse repolarization abnormalities particularly V4 to V6. T-wave 

flattening in 3 and aVF nonspecific findings)


QT: Normal


EKG Interpretation Comments: 





Borderline ECG





Course





- Vital Signs


Last Recorded V/S: 


 Last Vital Signs











Temp  36.6 C   03/05/19 18:13


 


Pulse  86   03/05/19 18:13


 


Resp  28 H  03/05/19 18:13


 


BP  130/79   03/05/19 18:13


 


Pulse Ox  93 L  03/05/19 18:13














- Orders/Labs/Meds


Orders: 


 Active Orders 24 hr











 Category Date Time Status


 


 Oxygen Therapy [RC] ASDIRECTED Care  03/05/19 14:16 Active


 


 RT Aerosol Therapy [RC] ASDIRECTED Care  03/05/19 14:18 Active


 


 Sodium Chloride 0.9% [Saline Flush] Med  03/05/19 14:17 Active





 10 ml FLUSH ASDIRECTED PRN   


 


 Peripheral IV Insertion Adult [OM.PC] Stat Oth  03/05/19 14:18 Ordered








 Medication Orders





Albuterol (Proventil Neb Soln)  2.5 mg NEB Q4HRRT PRN


   PRN Reason: Shortness of Breath


Albuterol/Ipratropium (Duoneb 3.0-0.5 Mg/3 Ml)  3 ml NEB QIDRT RAQUEL


Aspirin (Aspirin)  81 mg PO DAILY RAQUEL


Carvedilol (Coreg)  6.25 mg PO BID RAQUEL


Enoxaparin Sodium (Lovenox)  40 mg SUBCUT Q24H RAQUEL


Hydralazine HCl (Apresoline)  20 mg IVPUSH Q6H PRN


   PRN Reason: Hypertension


Lorazepam (Ativan)  1 mg PO TID PRN


   PRN Reason: Anxiety


Montelukast Sodium (Singulair)  10 mg PO BEDTIME RAQUEL


Nicotine (Habitrol)  14 mg TOP DAILY RAQUEL


Oseltamivir Phosphate (Tamiflu)  75 mg PO BID RAQUEL


Paroxetine HCl (Paxil)  40 mg PO DAILY RAQUEL


Simvastatin (Zocor)  10 mg PO BEDTIME RAQUEL


Sodium Chloride (Saline Flush)  10 ml FLUSH ASDIRECTED PRN


   PRN Reason: Keep Vein Open


   Last Admin: 03/05/19 15:10  Dose: 10 ml


Zolpidem Tartrate (Ambien)  10 mg PO BEDTIME RAQUEL








Labs: 


 Laboratory Tests











  03/05/19 03/05/19 03/05/19 Range/Units





  14:08 14:24 14:24 


 


WBC   5.37   (3.98-10.04)  K/mm3


 


RBC   4.27   (3.98-5.22)  M/mm3


 


Hgb   13.1   (11.2-15.7)  gm/L


 


Hct   39.2   (34.1-44.9)  %


 


MCV   91.8   (79.4-94.8)  fl


 


MCH   30.7   (25.6-32.2)  pg


 


MCHC   33.4   (32.2-35.5)  g/dl


 


RDW Std Deviation   43.6   (36.4-46.3)  fL


 


Plt Count   232   (182-369)  K/mm3


 


MPV   10.7   (9.4-12.3)  fl


 


Neutrophils % (Manual)   82 H   (40-60)  %


 


Band Neutrophils %   0   (0-10)  %


 


Lymphocytes % (Manual)   12 L   (20-40)  %


 


Atypical Lymphs %   0   %


 


Monocytes % (Manual)   5   (2-10)  %


 


Eosinophils % (Manual)   1   (0.7-5.8)  %


 


Basophils % (Manual)   0 L   (0.1-1.2)  


 


Platelet Estimate   Adequate   


 


RBC Morph Comment   Normal   


 


ESR     (0-20)  mm/hr


 


PT    10.8  (9.5-12.1)  SECONDS


 


INR    0.99  


 


D-Dimer, Quantitative    0.66 H  (0.19-0.50)  mg/L


 


Puncture Site  Rt radial    


 


ABG pH  7.35    (7.35-7.45)  


 


ABG pCO2  47.2 H    (35.0-45.0)  mmHg


 


ABG pO2  69.0 L    (80.0-100.0)  mmHg


 


ABG HCO3  25.3    (22.0-26.0)  meq/L


 


ABG O2 Saturation  93.3 L    (96.0-97.0)  %


 


ABG Base Excess  -0.2    (-2-2.0)  


 


A-a Gradient  51    mmHg


 


O2 Delivery Device  Nasal cannula    


 


Oxygen Flow Rate  2.0    


 


FiO2  28.00    (21..00)  %


 


Sodium     (136-145)  mEq/L


 


Potassium     (3.5-5.1)  mEq/L


 


Chloride     ()  mEq/L


 


Carbon Dioxide     (21-32)  mEq/L


 


Anion Gap     (5-15)  


 


BUN     (7-18)  mg/dL


 


Creatinine     (0.55-1.02)  mg/dL


 


Est Cr Clr Drug Dosing     mL/min


 


Estimated GFR (MDRD)     (>60)  mL/min


 


BUN/Creatinine Ratio     (14-18)  


 


Glucose     ()  mg/dL


 


Calcium     (8.5-10.1)  mg/dL


 


Magnesium     (1.8-2.4)  mg/dl


 


Total Bilirubin     (0.2-1.0)  mg/dL


 


AST     (15-37)  U/L


 


ALT     (14-59)  U/L


 


Alkaline Phosphatase     ()  U/L


 


CK-MB (CK-2)     (0-3.6)  ng/ml


 


Troponin I     (0.00-0.056)  ng/mL


 


C-Reactive Protein     (<1.0)  mg/dL


 


NT-Pro-B Natriuret Pep     (0-125)  pg/mL


 


Total Protein     (6.4-8.2)  g/dl


 


Albumin     (3.4-5.0)  g/dl


 


Globulin     gm/dL


 


Albumin/Globulin Ratio     (1-2)  


 


Mycoplasma pneumon IgM     (NEGATIVE)  














  03/05/19 03/05/19 03/05/19 Range/Units





  14:24 14:24 14:24 


 


WBC     (3.98-10.04)  K/mm3


 


RBC     (3.98-5.22)  M/mm3


 


Hgb     (11.2-15.7)  gm/L


 


Hct     (34.1-44.9)  %


 


MCV     (79.4-94.8)  fl


 


MCH     (25.6-32.2)  pg


 


MCHC     (32.2-35.5)  g/dl


 


RDW Std Deviation     (36.4-46.3)  fL


 


Plt Count     (182-369)  K/mm3


 


MPV     (9.4-12.3)  fl


 


Neutrophils % (Manual)     (40-60)  %


 


Band Neutrophils %     (0-10)  %


 


Lymphocytes % (Manual)     (20-40)  %


 


Atypical Lymphs %     %


 


Monocytes % (Manual)     (2-10)  %


 


Eosinophils % (Manual)     (0.7-5.8)  %


 


Basophils % (Manual)     (0.1-1.2)  


 


Platelet Estimate     


 


RBC Morph Comment     


 


ESR   25 H   (0-20)  mm/hr


 


PT     (9.5-12.1)  SECONDS


 


INR     


 


D-Dimer, Quantitative     (0.19-0.50)  mg/L


 


Puncture Site     


 


ABG pH     (7.35-7.45)  


 


ABG pCO2     (35.0-45.0)  mmHg


 


ABG pO2     (80.0-100.0)  mmHg


 


ABG HCO3     (22.0-26.0)  meq/L


 


ABG O2 Saturation     (96.0-97.0)  %


 


ABG Base Excess     (-2-2.0)  


 


A-a Gradient     mmHg


 


O2 Delivery Device     


 


Oxygen Flow Rate     


 


FiO2     (21..00)  %


 


Sodium  135 L    (136-145)  mEq/L


 


Potassium  4.3    (3.5-5.1)  mEq/L


 


Chloride  98    ()  mEq/L


 


Carbon Dioxide  27    (21-32)  mEq/L


 


Anion Gap  14.3    (5-15)  


 


BUN  13    (7-18)  mg/dL


 


Creatinine  0.9    (0.55-1.02)  mg/dL


 


Est Cr Clr Drug Dosing  53.10    mL/min


 


Estimated GFR (MDRD)  > 60    (>60)  mL/min


 


BUN/Creatinine Ratio  14.4    (14-18)  


 


Glucose  150 H    ()  mg/dL


 


Calcium  9.1    (8.5-10.1)  mg/dL


 


Magnesium  1.9    (1.8-2.4)  mg/dl


 


Total Bilirubin  0.4    (0.2-1.0)  mg/dL


 


AST  24    (15-37)  U/L


 


ALT  23    (14-59)  U/L


 


Alkaline Phosphatase  67    ()  U/L


 


CK-MB (CK-2)  0.8    (0-3.6)  ng/ml


 


Troponin I  0.036    (0.00-0.056)  ng/mL


 


C-Reactive Protein  9.4 H*    (<1.0)  mg/dL


 


NT-Pro-B Natriuret Pep    1257 H  (0-125)  pg/mL


 


Total Protein  7.0    (6.4-8.2)  g/dl


 


Albumin  3.5    (3.4-5.0)  g/dl


 


Globulin  3.5    gm/dL


 


Albumin/Globulin Ratio  1.0    (1-2)  


 


Mycoplasma pneumon IgM  Negative    (NEGATIVE)  











Meds: 


Medications











Generic Name Dose Route Start Last Admin





  Trade Name Freq  PRN Reason Stop Dose Admin


 


Albuterol  2.5 mg  03/05/19 20:07  





  Proventil Neb Soln  NEB   





  Q4HRRT PRN   





  Shortness of Breath   





     





     





     


 


Albuterol/Ipratropium  3 ml  03/05/19 21:00  





  Duoneb 3.0-0.5 Mg/3 Ml  NEB   





  QIDRT Pending sale to [facility]   





     





     





     





     


 


Aspirin  81 mg  03/06/19 09:00  





  Aspirin  PO   





  DAILY Pending sale to [facility]   





     





     





     





     


 


Carvedilol  6.25 mg  03/05/19 21:00  





  Coreg  PO   





  BID Pending sale to [facility]   





     





     





     





     


 


Enoxaparin Sodium  40 mg  03/05/19 20:30  





  Lovenox  SUBCUT   





  Q24H RAQUEL   





     





     





     





     


 


Hydralazine HCl  20 mg  03/05/19 20:01  





  Apresoline  IVPUSH   





  Q6H PRN   





  Hypertension   





     





     





     


 


Lorazepam  1 mg  03/05/19 19:52  





  Ativan  PO   





  TID PRN   





  Anxiety   





     





     





     


 


Montelukast Sodium  10 mg  03/05/19 21:00  





  Singulair  PO   





  BEDTIME Pending sale to [facility]   





     





     





     





     


 


Nicotine  14 mg  03/06/19 09:00  





  Habitrol  TOP   





  DAILY Pending sale to [facility]   





     





     





     





     


 


Oseltamivir Phosphate  75 mg  03/06/19 08:00  





  Tamiflu  PO   





  BID Pending sale to [facility]   





     





     





     





     


 


Paroxetine HCl  40 mg  03/06/19 09:00  





  Paxil  PO   





  DAILY Pending sale to [facility]   





     





     





     





     


 


Simvastatin  10 mg  03/05/19 21:00  





  Zocor  PO   





  BEDTIME RAQUEL   





     





     





     





     


 


Sodium Chloride  10 ml  03/05/19 14:17  03/05/19 15:10





  Saline Flush  FLUSH   10 ml





  ASDIRECTED PRN   Administration





  Keep Vein Open   





     





     





     


 


Zolpidem Tartrate  10 mg  03/05/19 21:00  





  Ambien  PO   





  BEDTIME RAQUEL   





     





     





     





     














Discontinued Medications














Generic Name Dose Route Start Last Admin





  Trade Name Freq  PRN Reason Stop Dose Admin


 


Albuterol/Ipratropium  3 ml  03/05/19 14:18  03/05/19 14:49





  Duoneb 3.0-0.5 Mg/3 Ml  NEB  03/05/19 14:19  3 ml





  ONETIME ONE   Administration





     





     





     





     


 


Albuterol/Ipratropium  3 ml  03/05/19 21:00  





  Duoneb 3.0-0.5 Mg/3 Ml  NEB   





  QID RAQUEL   





     





     





     





     


 


Furosemide  40 mg  03/05/19 14:31  03/05/19 15:10





  Lasix  IVPUSH  03/05/19 14:32  40 mg





  NOW ONE   Administration





     





     





     





     


 


Oseltamivir Phosphate  75 mg  03/05/19 16:57  03/05/19 17:03





  Tamiflu  PO  03/05/19 16:58  75 mg





  ONETIME ONE   Administration





     





     





     





     














- Radiology Interpretation


Free Text/Narrative:: 


68-year-old female presents to the ED with her son. She has been developing 

gradually worsening dyspnea over the last 4 or 5 days. She has a history of 

severe COPD with cor pulmonale. Not on home oxygen therapy. She does have a 

very productive sounding cough but is unable to expectorate any sputum. She has 

no hemoptysis fever or chills. Appetite is poor as she is very weak she had a 

severe headache upon coming to the ED which has since dissipated since starting 

oxygen at 2 L/m. I suspect she was hypercarbic upon arrival causing the 

headache. She has used her DuoNeb neb treatment at home 3 in the last 8 hours 

with no relief improvement. She had elevated cardiac markers and elevated BNP 

when seen December 30 and I had sent her to cardiology in [place]. They 

diagnosed her with"broken heart syndrome" with normal angiogram. She is suspect 

to have cor pulmonale. She started prednisone 20 mg twice daily yesterday 

morning. Plan she will have the chest x-ray carried out ECG. Cardiac markers 

including BNP and d-dimer. Influenza screen will be done as she likely will 

need to be admitted to the hospital for oxygen support. Of note she was 

supposed to go to [place] today for respiratory function studies but was too 

sick to make the trip early may develop to the car according to her son. I will 

give her Lasix 40 mg IV. Examination suggests bilateral significant expiratory 

wheeze with decreased air entry at the lower 50% of lung fields posteriorly. I 

will also give her one treatment here with DuoNeb.








- Re-Assessments/Exams


Free Text/Narrative Re-Assessment/Exam: 





03/05/19 16:31 chest x-ray reveals borderline cardiomegaly. Lung fields are 

slightly hyperinflated but clear there is no pleural effusion.





03/05/19 16:33 Labs reveal a normal white count at 5.37. Differential is 82% 

neutrophils remember she is on steroids started yesterday. Hemoglobin is 13.1 

with hematocrit of 39.2. Reticulocyte count is 232,000. Sedimentation rate is 

25. PT is 10.8 with an INR of 0.99. D-dimer is 0.66 normal for her age. Her 

ABGs revealed a pH of 7.35. PCO2 was elevated at 47.2 PO2 was 69.0 this was 

done on 2 L/m by nasal cannula. O2 sats 93.3% . Sodium was 135 with a potassium 

of 4.3. Chloride 98 with a bicarbonate of 27. Anion gap is 14.3. BUN is 13 with 

a creatinine of 0.9. Estimated GFR is greater than 60. Glucose slightly 

elevated at 150. Calcium 9.1 magnesium 1.9. Total bilirubin 0.4. Liver function 

normal CK-MB fraction 0.8. Troponin I is less than 0.036. C-reactive protein is 

elevated at 9.4 suggesting underlying bacterial infection. BNP is mildly 

elevated at 1257. Mycoplasma pneumonia is negative. She is also influenza A 

positive.





03/05/19 16:45 S the findings with the patient. Decision made to place her on 

Tamiflu 75 mg now. She will require admission to the hospital because she is so 

hypoxic on room air. Ideally she requires oxygen at home. However she lives up 

by [facility] stress case with on call hospitalist with a view to admission 

in the hospital if there are still any beds available.





03/05/19 17:15: Spoke with Dr. Mejia on call hospitalist and she will see the 

patient in the ED.





03/05/19 17:30: Dr. Mejia is seen the patient in the ED and agrees with 

admission to [facility] surgery on telemetry. She is being admitted primarily because 

she is hypoxic on room air at 86% due to her COPD. With influenza A this is 

compromising her respiratory function.  states she does feel much better 

after diuresing at least a liter of fluid after Lasix was given IV


 Therefore her congestive failure was contributing to her dyspnea as well as 

her hypoxia.











Departure





- Departure


Time of Disposition: 17:35


Disposition: Admitted As Inpatient 68


Condition: Fair


Clinical Impression: 


 COPD exacerbation, Influenza A





Congestive heart failure


Qualifiers:


 Heart failure type: unspecified Heart failure chronicity: acute Qualified Code(

s): I50.9 - Heart failure, unspecified








- Discharge Information


*PRESCRIPTION DRUG MONITORING PROGRAM REVIEWED*: Not Applicable


*COPY OF PRESCRIPTION DRUG MONITORING REPORT IN PATIENT [facility]: Not Applicable





- My Orders


Last 24 Hours: 


My Active Orders





03/05/19 14:16


Oxygen Therapy [RC] ASDIRECTED 





03/05/19 14:17


Sodium Chloride 0.9% [Saline Flush]   10 ml FLUSH ASDIRECTED PRN 





03/05/19 14:18


RT Aerosol Therapy [RC] ASDIRECTED 


Peripheral IV Insertion Adult [OM.PC] Stat 














- Assessment/Plan


Last 24 Hours: 


My Active Orders





03/05/19 14:16


Oxygen Therapy [RC] ASDIRECTED 





03/05/19 14:17


Sodium Chloride 0.9% [Saline Flush]   10 ml FLUSH ASDIRECTED PRN 





03/05/19 14:18


RT Aerosol Therapy [RC] ASDIRECTED 


Peripheral IV Insertion Adult [OM.PC] Stat

## 2019-03-05 NOTE — CR
Chest: Portable view of the chest was obtained.

 

Comparison: Prior chest x-ray of 01/27/19.

 

Slightly prominent cardiophrenic fat pad is seen on the right side 

which appears chronic.  Lungs are clear with no acute parenchymal 

change.  Heart size and mediastinum are within normal limits.  Bony 

structures are grossly intact.  Incidental note of old healed left 

clavicle fracture.

 

Impression:

1.  Nothing acute is seen on portable chest x-ray.

 

Diagnostic code #2

## 2019-03-06 LAB — HBA1C BLD-MCNC: 5.9 % (ref 4.5–6.2)

## 2019-03-06 NOTE — [id number]
**Discharge Summary





- Hospital Course


HPI Initial Comments: 


Merlyn Haney is a 69 yo female is added to our ED on 3/5/19 with 

significant dyspnea on minimal exertion.  Her son reports she was doing bizarre 

things and somewhat confused, acting like she was drunk and very short of 

breath.  She does have a productive sounding cough but is unable to get 

anything up.  Denies any hemoptysis, fever, chills.  She had earlier been 

started on a 20 mg daily dose prednisone and she was supposed to undergo PFTs 

early in the day to determine if she needed oxygen.  Saturations in the ED were 

36% and she was placed on 2 L, improving saturations up to 96%.  She reports a 

severe headache which improved with oxygen therapy.  She's had very poor intake 

the last 2-3 days.  Denies any chest pain or increased pedal edema.  She had 

taken 3 DuoNeb's in the prior 8 hours without any real improvement in 

breathing.  Reports shortness of breath on exertion and worsening orthopnea.





In the ED twelve-lead EKG is obtained showing a sinus rhythm at 84 beats per 

minutes.  P waves are enlarged and there is nonspecific intraventricular 

conduction delay noted.  There are diffuse repolarization abnormalities, worse 

in V4 through V6.  There is also T-wave flattening in III and aVF.  Temp is 

36.6 Celsius.  Pulse 86.  Respirations 28.  Blood pressure 130/79.  Oxygen 

saturation 93%.  As her obtained: WBC is normal at 5.37.  Hemoglobin 13.1.  

Hematocrit 39.2.  She is normocytic.  Blood sugar 232,000.  Neutrophils are 

elevated at 82%.  There is no bandemia.  PT is 10.8.  INR is 0.99.  D-dimer is 

elevated at 0.66.  ABGs obtained in the right radial showing a pH of 7.35.  

PCO2 of 47.2.  PO2 of 69.0.  Bicarbonate 25.3.  O2 saturations 93.3.  Base 

excess is -0.2. A-a gradient is 51.  This is obtained while on a nasal cannula 

at 2 L.  ESR is 25.  Sodium is 135.  Potassium 4.3.  Chloride 98.  Carbon 

dioxide 27.  Anion gap is 14.3.  BUN is 13.  Creatinine 0.9.  EGFR greater than 

60.  Glucose high at 150.  Calcium 9.1.  Magnesium 1.9.  Bilirubin 0.4.  AST is 

24, ALT 23, alkaline phosphatase 67.  CK-MB is 0.8.  Troponin is 0.036.  CRP is 

9.4.  ProBNP is 1257.  Protein 7.0.  Albumin 3.5.  Mycoplasma pneumonia was 

negative.  She had been seen on December 30 and was sent to cardiology in 

[place].  She was diagnosed with "broken heart syndrome" with normal 

angiogram.  She is suspected to have core pulmonology.  He is given 40 mg IV 

Lasix and DuoNeb.  Chest x-rays obtained and interpreted by Dr. Rivera as 

showing nothing acute.  Heart size and mediastinum are noted to be within 

normal limits.  Insulin they returns positive.  Surgeon on Tamiflu 75 mg.  Due 

to her hypoxia and also the fact that she lives in [place], [place], she 

is admitted to the hospital.





She carries a history of: Impaired vision, cardiomyopathy, MI, pulmonary 

hypertension, recurrent bronchitis, COPD, shortness of breath, recurrent UTI, 

anxiety, depression.  She is a former smoker who last used tobacco on December 30. She is a full code. Her PCP is not in the area. 





Diagnosis: Stroke: No





- Discharge Data


Discharge Date: 03/06/19 (Admit date: 3/5/19)


Discharge Disposition: DC/Tfer to Acute Hospital 02


Condition: Good





- Discharge Diagnosis/Problem(s)


(1) Elevated troponin


SNOMED Code(s): 493574722, 615961370, 620954252


   ICD Code: R74.8 - ABNORMAL LEVELS OF OTHER SERUM ENZYMES   Status: Acute   

Priority: High   Current Visit: Yes   





(2) COPD exacerbation


SNOMED Code(s): 371714004


   ICD Code: J44.1 - CHRONIC OBSTRUCTIVE PULMONARY DISEASE W (ACUTE) 

EXACERBATION   Status: Acute   Priority: High   Current Visit: Yes   





(3) Congestive heart failure


SNOMED Code(s): 91737623


   ICD Code: I50.9 - HEART FAILURE, UNSPECIFIED   Status: Acute   Priority: 

High   Current Visit: Yes   


Qualifiers: 


   Heart failure type: unspecified   Heart failure chronicity: acute   

Qualified Code(s): I50.9 - Heart failure, unspecified   





(4) Influenza A


SNOMED Code(s): 064573933


   ICD Code: J10.1 - FLU DUE TO OTH IDENT INFLUENZA VIRUS W OTH RESP MANIFEST   

Status: Acute   Priority: High   Current Visit: Yes   





(5) Takotsubo cardiomyopathy


SNOMED Code(s): 208656207


   ICD Code: I51.81 - TAKOTSUBO SYNDROME   Status: Acute   Priority: High   

Current Visit: Yes   





- Patient Summary/Data


Labs Pending at D/C: 


Viral respiratory panel, strep pneumonia, strep a culture





Hospital Course: 


Merlyn was admitted for influenza A and hypoxia.  She was given 75 mg Tamiflu 

in the ED and once on the floor switch to 30 mg twice a day.  Infectious workup 

was otherwise negative however some tests as noted above are still pending.  We 

will able to wean her down from 2 L to 1 L via nasal cannula.  She is not on 

home oxygen however she was scheduled to have PFTs performed on 3/5/19 as it is 

likely she will require home oxygen.  She is a long-time smoker who did 

reportedly stop smoking on December 30. She is on a home nicotine patch. D-

dimer was obtained in the ED and was 0.66.  Today was repeated and was 0.74.  

She did get slightly more hypercapnic with an original PCO2 of 47.2 and PCO2 

today of 40.3.  Oxygen saturations have remained stable.  Lipid panel was 

obtained showing triglycerides of 90, total cholesterol 159, LDL of 82, HDL of 

69.  CRP has been trending down from 9.4-7.1.  ProBNP today is not back yet.  

She did have an increase in troponin from 0.036 yesterday on admission to 2.130 

today.  She denied any chest pain however her son did note that she was a bit 

more short of breath and confused today.  He states she had very similar 

symptoms prior to being transferred to [place] last time.  He reports she came 

into the emergency room on December 30 and was thought to be having a heart 

attack she was then transferred to [place].  Apparently during her stay her 

respiratory status significantly declined and she was subsequently intubated.  

She was then rushed to the Cath Lab.  Due to the elevated troponins 325 mg 

aspirin was given today along with a 300 mg loading dose of Plavix.  2 view 

chest x-ray was obtained and nothing acute is noted.  This is pending formal 

radiology read.  Twelve-lead EKG is obtained showing sinus rhythm at 74 beats 

per minutes.  There is minimal ST depression in the lateral leads less than 1 

mm.  The elevation is noted.  No T-wave inversion is noted.





Given the above clinical picture and her significant cardiac history [facility] one call was contacted and case was discussed with Dr. Costa, 

cardiology.  Coincidentally he was the cardiologist who took Merlyn to the 

Cath Lab during her last stay and he is very familiar with her.  He reports her 

cardiac vessels were good at that time and she is believed to have Takotsubo 

cardiomyopathy.  He suspects her troponins are increasing due to the stress 

from her viral illness.  Due to this he said she will likely not require 

catheterization however he does agree that she would likely benefit from a 

close cardiology following.  Report then given to Dr. Padron, hospitalist, who 

agrees to transfer.  She'll be transferred by [facility] ground ambulance with cardiac 

monitor and O2 as ordered.  Plan was discussed with patient and her son who is 

at bedside and all agreed to transfer.








- Patient Instructions


Diet: Heart Healthy Diet





- Discharge Plan


*PRESCRIPTION DRUG MONITORING PROGRAM REVIEWED*: Not Applicable


*COPY OF PRESCRIPTION DRUG MONITORING REPORT IN PATIENT JUDSON: Not Applicable


Home Medications: 


 Home Meds





LORazepam [Ativan] 1 mg PO TID PRN 12/30/18 [History]


Zolpidem [Ambien] 10 mg PO BEDTIME 12/30/18 [History]


Aspirin 81 mg PO DAILY 01/04/19 [History]


Carvedilol 6.25 mg PO BID 01/04/19 [History]


Montelukast [Singulair] 10 mg PO BEDTIME 01/04/19 [History]


Nicotine [Nicotine Patch] 14 mg TOP DAILY 01/04/19 [History]


Albuterol/Ipratropium [DuoNeb 3.0-0.5 MG/3 ML] 3 ml NEB QID PRN 01/27/19 [

History]


Furosemide [Lasix] 20 mg PO DAILY 01/27/19 [History]


PARoxetine HCl [Paroxetine HCl] 40 mg PO DAILY 01/27/19 [History]


Potassium Chloride 10 meq PO DAILY 01/27/19 [History]


Losartan [Cozaar] 25 mg PO DAILY 03/05/19 [History]


atorvaSTATin [Lipitor] 10 mg PO DAILY 03/05/19 [History]








Oxygen Therapy Mode: Nasal Cannula


Oxygen Flow Rate (L/min): 1


Maintain SpO2% greater than: 90


Forms:  ED Department Discharge


Referrals: 


PCP,Not In Area [Primary Care Provider] - 





- Discharge Summary/Plan Comment


DC Time >30 min.: Yes (1.5 hour)





- General Info


Date of Service: 03/06/19


Admission Dx/Problem (Free Text: 


 Admission Diagnosis/Problem





Admission Diagnosis/Problem      Influenza due to influenza A virus








Functional Status: Reports: Pain Controlled, Tolerating Diet, Ambulating, 

Urinating, Incentive Spirometry.  Denies: New Symptoms





- Review of Systems


General: Reports: Weakness, Fatigue, Malaise.  Denies: Fever, Chills


HEENT: Denies: Headaches, Sore Throat


Pulmonary: Reports: Shortness of Breath, Pleuritic Chest Pain, Cough, Wheezing.

  Denies: Sputum


Cardiovascular: Reports: Dyspnea on Exertion, Orthopnea.  Denies: Chest Pain, 

Palpitations, Edema


Gastrointestinal: Reports: No Symptoms.  Denies: Abdominal Pain, Constipation, 

Diarrhea, Nausea, Vomiting


Genitourinary: Reports: No Symptoms.  Denies: Pain


Musculoskeletal: Reports: No Symptoms


Skin: Reports: No Symptoms.  Denies: Cyanosis


Neurological: Reports: Confusion (son notes worsening confusion today and 

yesterday night ).  Denies: Pre-Existing Deficit, Trouble Speaking, Difficulty 

Walking, Gait Disturbance


Psychiatric: Reports: No Symptoms





- Patient Data


Vitals - Most Recent: 


 Last Vital Signs











Temp  98.2 F   03/06/19 08:16


 


Pulse  83   03/06/19 08:33


 


Resp  20   03/06/19 08:16


 


BP  117/44 L  03/06/19 08:33


 


Pulse Ox  96   03/06/19 08:16











Weight - Most Recent: 161 lb 14.4 oz


I&O - Last 24 hours: 


 Intake & Output











 03/05/19 03/06/19 03/06/19





 22:59 06:59 14:59


 


Intake Total  1300 


 


Output Total  800 


 


Balance  500 











Lab Results - Last 24 hrs: 


 Laboratory Results - last 24 hr











  03/05/19 03/05/19 03/05/19 Range/Units





  14:08 14:24 14:24 


 


WBC   5.37   (3.98-10.04)  K/mm3


 


RBC   4.27   (3.98-5.22)  M/mm3


 


Hgb   13.1   (11.2-15.7)  gm/L


 


Hct   39.2   (34.1-44.9)  %


 


MCV   91.8   (79.4-94.8)  fl


 


MCH   30.7   (25.6-32.2)  pg


 


MCHC   33.4   (32.2-35.5)  g/dl


 


RDW Std Deviation   43.6   (36.4-46.3)  fL


 


Plt Count   232   (182-369)  K/mm3


 


MPV   10.7   (9.4-12.3)  fl


 


Neut % (Auto)     (34.0-71.1)  %


 


Lymph % (Auto)     (19.3-51.7)  %


 


Mono % (Auto)     (4.7-12.5)  %


 


Eos % (Auto)     (0.7-5.8)  


 


Baso % (Auto)     (0.1-1.2)  %


 


Neut # (Auto)     (1.56-6.13)  K/mm3


 


Lymph # (Auto)     (1.18-3.74)  K/mm3


 


Mono # (Auto)     (0.24-0.36)  K/mm3


 


Eos # (Auto)     (0.04-0.36)  K/mm3


 


Baso # (Auto)     (0.01-0.08)  K/mm3


 


Neutrophils % (Manual)   82 H   (40-60)  %


 


Band Neutrophils %   0   (0-10)  %


 


Lymphocytes % (Manual)   12 L   (20-40)  %


 


Atypical Lymphs %   0   %


 


Monocytes % (Manual)   5   (2-10)  %


 


Eosinophils % (Manual)   1   (0.7-5.8)  %


 


Basophils % (Manual)   0 L   (0.1-1.2)  


 


Platelet Estimate   Adequate   


 


RBC Morph Comment   Normal   


 


ESR     (0-20)  mm/hr


 


PT    10.8  (9.5-12.1)  SECONDS


 


INR    0.99  


 


D-Dimer, Quantitative    0.66 H  (0.19-0.50)  mg/L


 


Puncture Site  Rt radial    


 


ABG pH  7.35    (7.35-7.45)  


 


ABG pCO2  47.2 H    (35.0-45.0)  mmHg


 


ABG pO2  69.0 L    (80.0-100.0)  mmHg


 


ABG HCO3  25.3    (22.0-26.0)  meq/L


 


ABG O2 Saturation  93.3 L    (96.0-97.0)  %


 


ABG Base Excess  -0.2    (-2-2.0)  


 


Art Test     


 


A-a Gradient  51    mmHg


 


O2 Delivery Device  Nasal cannula    


 


Oxygen Flow Rate  2.0    


 


FiO2  28.00    (21..00)  %


 


Sodium     (136-145)  mEq/L


 


Potassium     (3.5-5.1)  mEq/L


 


Chloride     ()  mEq/L


 


Carbon Dioxide     (21-32)  mEq/L


 


Anion Gap     (5-15)  


 


BUN     (7-18)  mg/dL


 


Creatinine     (0.55-1.02)  mg/dL


 


Est Cr Clr Drug Dosing     mL/min


 


Estimated GFR (MDRD)     (>60)  mL/min


 


BUN/Creatinine Ratio     (14-18)  


 


Glucose     ()  mg/dL


 


Hemoglobin A1c     (4.50-6.20)  %


 


Lactic Acid     (0.4-2.0)  mmol/L


 


Calcium     (8.5-10.1)  mg/dL


 


Magnesium     (1.8-2.4)  mg/dl


 


Total Bilirubin     (0.2-1.0)  mg/dL


 


AST     (15-37)  U/L


 


ALT     (14-59)  U/L


 


Alkaline Phosphatase     ()  U/L


 


CK-MB (CK-2)     (0-3.6)  ng/ml


 


Troponin I     (0.00-0.056)  ng/mL


 


C-Reactive Protein     (<1.0)  mg/dL


 


NT-Pro-B Natriuret Pep     (0-125)  pg/mL


 


Total Protein     (6.4-8.2)  g/dl


 


Albumin     (3.4-5.0)  g/dl


 


Globulin     gm/dL


 


Albumin/Globulin Ratio     (1-2)  


 


Triglycerides     (<150)  mg/dL


 


Cholesterol     (<200)  mg/dL


 


LDL Cholesterol Direct     (<100)  mg/dL


 


HDL Cholesterol     (40-59)  mg/dL


 


Mycoplasma pneumon IgM     (NEGATIVE)  














  03/05/19 03/05/19 03/05/19 Range/Units





  14:24 14:24 14:24 


 


WBC     (3.98-10.04)  K/mm3


 


RBC     (3.98-5.22)  M/mm3


 


Hgb     (11.2-15.7)  gm/L


 


Hct     (34.1-44.9)  %


 


MCV     (79.4-94.8)  fl


 


MCH     (25.6-32.2)  pg


 


MCHC     (32.2-35.5)  g/dl


 


RDW Std Deviation     (36.4-46.3)  fL


 


Plt Count     (182-369)  K/mm3


 


MPV     (9.4-12.3)  fl


 


Neut % (Auto)     (34.0-71.1)  %


 


Lymph % (Auto)     (19.3-51.7)  %


 


Mono % (Auto)     (4.7-12.5)  %


 


Eos % (Auto)     (0.7-5.8)  


 


Baso % (Auto)     (0.1-1.2)  %


 


Neut # (Auto)     (1.56-6.13)  K/mm3


 


Lymph # (Auto)     (1.18-3.74)  K/mm3


 


Mono # (Auto)     (0.24-0.36)  K/mm3


 


Eos # (Auto)     (0.04-0.36)  K/mm3


 


Baso # (Auto)     (0.01-0.08)  K/mm3


 


Neutrophils % (Manual)     (40-60)  %


 


Band Neutrophils %     (0-10)  %


 


Lymphocytes % (Manual)     (20-40)  %


 


Atypical Lymphs %     %


 


Monocytes % (Manual)     (2-10)  %


 


Eosinophils % (Manual)     (0.7-5.8)  %


 


Basophils % (Manual)     (0.1-1.2)  


 


Platelet Estimate     


 


RBC Morph Comment     


 


ESR   25 H   (0-20)  mm/hr


 


PT     (9.5-12.1)  SECONDS


 


INR     


 


D-Dimer, Quantitative     (0.19-0.50)  mg/L


 


Puncture Site     


 


ABG pH     (7.35-7.45)  


 


ABG pCO2     (35.0-45.0)  mmHg


 


ABG pO2     (80.0-100.0)  mmHg


 


ABG HCO3     (22.0-26.0)  meq/L


 


ABG O2 Saturation     (96.0-97.0)  %


 


ABG Base Excess     (-2-2.0)  


 


Art Test     


 


A-a Gradient     mmHg


 


O2 Delivery Device     


 


Oxygen Flow Rate     


 


FiO2     (21..00)  %


 


Sodium  135 L    (136-145)  mEq/L


 


Potassium  4.3    (3.5-5.1)  mEq/L


 


Chloride  98    ()  mEq/L


 


Carbon Dioxide  27    (21-32)  mEq/L


 


Anion Gap  14.3    (5-15)  


 


BUN  13    (7-18)  mg/dL


 


Creatinine  0.9    (0.55-1.02)  mg/dL


 


Est Cr Clr Drug Dosing  53.10    mL/min


 


Estimated GFR (MDRD)  > 60    (>60)  mL/min


 


BUN/Creatinine Ratio  14.4    (14-18)  


 


Glucose  150 H    ()  mg/dL


 


Hemoglobin A1c     (4.50-6.20)  %


 


Lactic Acid     (0.4-2.0)  mmol/L


 


Calcium  9.1    (8.5-10.1)  mg/dL


 


Magnesium  1.9    (1.8-2.4)  mg/dl


 


Total Bilirubin  0.4    (0.2-1.0)  mg/dL


 


AST  24    (15-37)  U/L


 


ALT  23    (14-59)  U/L


 


Alkaline Phosphatase  67    ()  U/L


 


CK-MB (CK-2)  0.8    (0-3.6)  ng/ml


 


Troponin I  0.036    (0.00-0.056)  ng/mL


 


C-Reactive Protein  9.4 H*    (<1.0)  mg/dL


 


NT-Pro-B Natriuret Pep    1257 H  (0-125)  pg/mL


 


Total Protein  7.0    (6.4-8.2)  g/dl


 


Albumin  3.5    (3.4-5.0)  g/dl


 


Globulin  3.5    gm/dL


 


Albumin/Globulin Ratio  1.0    (1-2)  


 


Triglycerides     (<150)  mg/dL


 


Cholesterol     (<200)  mg/dL


 


LDL Cholesterol Direct     (<100)  mg/dL


 


HDL Cholesterol     (40-59)  mg/dL


 


Mycoplasma pneumon IgM  Negative    (NEGATIVE)  














  03/06/19 03/06/19 03/06/19 Range/Units





  05:00 05:00 05:00 


 


WBC  5.51    (3.98-10.04)  K/mm3


 


RBC  4.64    (3.98-5.22)  M/mm3


 


Hgb  14.0    (11.2-15.7)  gm/L


 


Hct  42.4    (34.1-44.9)  %


 


MCV  91.4    (79.4-94.8)  fl


 


MCH  30.2    (25.6-32.2)  pg


 


MCHC  33.0    (32.2-35.5)  g/dl


 


RDW Std Deviation  44.3    (36.4-46.3)  fL


 


Plt Count  245    (182-369)  K/mm3


 


MPV  10.9    (9.4-12.3)  fl


 


Neut % (Auto)  59.9    (34.0-71.1)  %


 


Lymph % (Auto)  25.6    (19.3-51.7)  %


 


Mono % (Auto)  13.8 H    (4.7-12.5)  %


 


Eos % (Auto)  0 L    (0.7-5.8)  


 


Baso % (Auto)  0.5    (0.1-1.2)  %


 


Neut # (Auto)  3.30    (1.56-6.13)  K/mm3


 


Lymph # (Auto)  1.41    (1.18-3.74)  K/mm3


 


Mono # (Auto)  0.76 H    (0.24-0.36)  K/mm3


 


Eos # (Auto)  0.00 L    (0.04-0.36)  K/mm3


 


Baso # (Auto)  0.03    (0.01-0.08)  K/mm3


 


Neutrophils % (Manual)     (40-60)  %


 


Band Neutrophils %     (0-10)  %


 


Lymphocytes % (Manual)     (20-40)  %


 


Atypical Lymphs %     %


 


Monocytes % (Manual)     (2-10)  %


 


Eosinophils % (Manual)     (0.7-5.8)  %


 


Basophils % (Manual)     (0.1-1.2)  


 


Platelet Estimate     


 


RBC Morph Comment     


 


ESR     (0-20)  mm/hr


 


PT     (9.5-12.1)  SECONDS


 


INR     


 


D-Dimer, Quantitative     (0.19-0.50)  mg/L


 


Puncture Site     


 


ABG pH     (7.35-7.45)  


 


ABG pCO2     (35.0-45.0)  mmHg


 


ABG pO2     (80.0-100.0)  mmHg


 


ABG HCO3     (22.0-26.0)  meq/L


 


ABG O2 Saturation     (96.0-97.0)  %


 


ABG Base Excess     (-2-2.0)  


 


Art Test     


 


A-a Gradient     mmHg


 


O2 Delivery Device     


 


Oxygen Flow Rate     


 


FiO2     (21..00)  %


 


Sodium   133 L   (136-145)  mEq/L


 


Potassium   3.8   (3.5-5.1)  mEq/L


 


Chloride   94 L   ()  mEq/L


 


Carbon Dioxide   28   (21-32)  mEq/L


 


Anion Gap   14.8   (5-15)  


 


BUN   17   (7-18)  mg/dL


 


Creatinine   0.9   (0.55-1.02)  mg/dL


 


Est Cr Clr Drug Dosing   53.10   mL/min


 


Estimated GFR (MDRD)   > 60   (>60)  mL/min


 


BUN/Creatinine Ratio   18.9 H   (14-18)  


 


Glucose   100   ()  mg/dL


 


Hemoglobin A1c     (4.50-6.20)  %


 


Lactic Acid    0.7  (0.4-2.0)  mmol/L


 


Calcium   9.0   (8.5-10.1)  mg/dL


 


Magnesium   1.8   (1.8-2.4)  mg/dl


 


Total Bilirubin     (0.2-1.0)  mg/dL


 


AST     (15-37)  U/L


 


ALT     (14-59)  U/L


 


Alkaline Phosphatase     ()  U/L


 


CK-MB (CK-2)     (0-3.6)  ng/ml


 


Troponin I   2.130 H*   (0.00-0.056)  ng/mL


 


C-Reactive Protein   7.1 H*   (<1.0)  mg/dL


 


NT-Pro-B Natriuret Pep     (0-125)  pg/mL


 


Total Protein     (6.4-8.2)  g/dl


 


Albumin     (3.4-5.0)  g/dl


 


Globulin     gm/dL


 


Albumin/Globulin Ratio     (1-2)  


 


Triglycerides     (<150)  mg/dL


 


Cholesterol     (<200)  mg/dL


 


LDL Cholesterol Direct     (<100)  mg/dL


 


HDL Cholesterol     (40-59)  mg/dL


 


Mycoplasma pneumon IgM     (NEGATIVE)  














  03/06/19 03/06/19 03/06/19 Range/Units





  05:00 05:00 05:00 


 


WBC     (3.98-10.04)  K/mm3


 


RBC     (3.98-5.22)  M/mm3


 


Hgb     (11.2-15.7)  gm/L


 


Hct     (34.1-44.9)  %


 


MCV     (79.4-94.8)  fl


 


MCH     (25.6-32.2)  pg


 


MCHC     (32.2-35.5)  g/dl


 


RDW Std Deviation     (36.4-46.3)  fL


 


Plt Count     (182-369)  K/mm3


 


MPV     (9.4-12.3)  fl


 


Neut % (Auto)     (34.0-71.1)  %


 


Lymph % (Auto)     (19.3-51.7)  %


 


Mono % (Auto)     (4.7-12.5)  %


 


Eos % (Auto)     (0.7-5.8)  


 


Baso % (Auto)     (0.1-1.2)  %


 


Neut # (Auto)     (1.56-6.13)  K/mm3


 


Lymph # (Auto)     (1.18-3.74)  K/mm3


 


Mono # (Auto)     (0.24-0.36)  K/mm3


 


Eos # (Auto)     (0.04-0.36)  K/mm3


 


Baso # (Auto)     (0.01-0.08)  K/mm3


 


Neutrophils % (Manual)     (40-60)  %


 


Band Neutrophils %     (0-10)  %


 


Lymphocytes % (Manual)     (20-40)  %


 


Atypical Lymphs %     %


 


Monocytes % (Manual)     (2-10)  %


 


Eosinophils % (Manual)     (0.7-5.8)  %


 


Basophils % (Manual)     (0.1-1.2)  


 


Platelet Estimate     


 


RBC Morph Comment     


 


ESR     (0-20)  mm/hr


 


PT     (9.5-12.1)  SECONDS


 


INR     


 


D-Dimer, Quantitative  0.74 H    (0.19-0.50)  mg/L


 


Puncture Site     


 


ABG pH     (7.35-7.45)  


 


ABG pCO2     (35.0-45.0)  mmHg


 


ABG pO2     (80.0-100.0)  mmHg


 


ABG HCO3     (22.0-26.0)  meq/L


 


ABG O2 Saturation     (96.0-97.0)  %


 


ABG Base Excess     (-2-2.0)  


 


Art Test     


 


A-a Gradient     mmHg


 


O2 Delivery Device     


 


Oxygen Flow Rate     


 


FiO2     (21..00)  %


 


Sodium     (136-145)  mEq/L


 


Potassium     (3.5-5.1)  mEq/L


 


Chloride     ()  mEq/L


 


Carbon Dioxide     (21-32)  mEq/L


 


Anion Gap     (5-15)  


 


BUN     (7-18)  mg/dL


 


Creatinine     (0.55-1.02)  mg/dL


 


Est Cr Clr Drug Dosing     mL/min


 


Estimated GFR (MDRD)     (>60)  mL/min


 


BUN/Creatinine Ratio     (14-18)  


 


Glucose     ()  mg/dL


 


Hemoglobin A1c   5.90   (4.50-6.20)  %


 


Lactic Acid     (0.4-2.0)  mmol/L


 


Calcium     (8.5-10.1)  mg/dL


 


Magnesium     (1.8-2.4)  mg/dl


 


Total Bilirubin     (0.2-1.0)  mg/dL


 


AST     (15-37)  U/L


 


ALT     (14-59)  U/L


 


Alkaline Phosphatase     ()  U/L


 


CK-MB (CK-2)    3.2  (0-3.6)  ng/ml


 


Troponin I     (0.00-0.056)  ng/mL


 


C-Reactive Protein     (<1.0)  mg/dL


 


NT-Pro-B Natriuret Pep     (0-125)  pg/mL


 


Total Protein     (6.4-8.2)  g/dl


 


Albumin     (3.4-5.0)  g/dl


 


Globulin     gm/dL


 


Albumin/Globulin Ratio     (1-2)  


 


Triglycerides     (<150)  mg/dL


 


Cholesterol     (<200)  mg/dL


 


LDL Cholesterol Direct     (<100)  mg/dL


 


HDL Cholesterol     (40-59)  mg/dL


 


Mycoplasma pneumon IgM     (NEGATIVE)  














  03/06/19 03/06/19 Range/Units





  05:00 07:48 


 


WBC    (3.98-10.04)  K/mm3


 


RBC    (3.98-5.22)  M/mm3


 


Hgb    (11.2-15.7)  gm/L


 


Hct    (34.1-44.9)  %


 


MCV    (79.4-94.8)  fl


 


MCH    (25.6-32.2)  pg


 


MCHC    (32.2-35.5)  g/dl


 


RDW Std Deviation    (36.4-46.3)  fL


 


Plt Count    (182-369)  K/mm3


 


MPV    (9.4-12.3)  fl


 


Neut % (Auto)    (34.0-71.1)  %


 


Lymph % (Auto)    (19.3-51.7)  %


 


Mono % (Auto)    (4.7-12.5)  %


 


Eos % (Auto)    (0.7-5.8)  


 


Baso % (Auto)    (0.1-1.2)  %


 


Neut # (Auto)    (1.56-6.13)  K/mm3


 


Lymph # (Auto)    (1.18-3.74)  K/mm3


 


Mono # (Auto)    (0.24-0.36)  K/mm3


 


Eos # (Auto)    (0.04-0.36)  K/mm3


 


Baso # (Auto)    (0.01-0.08)  K/mm3


 


Neutrophils % (Manual)    (40-60)  %


 


Band Neutrophils %    (0-10)  %


 


Lymphocytes % (Manual)    (20-40)  %


 


Atypical Lymphs %    %


 


Monocytes % (Manual)    (2-10)  %


 


Eosinophils % (Manual)    (0.7-5.8)  %


 


Basophils % (Manual)    (0.1-1.2)  


 


Platelet Estimate    


 


RBC Morph Comment    


 


ESR    (0-20)  mm/hr


 


PT    (9.5-12.1)  SECONDS


 


INR    


 


D-Dimer, Quantitative    (0.19-0.50)  mg/L


 


Puncture Site   Rt radial  


 


ABG pH   7.35  (7.35-7.45)  


 


ABG pCO2   48.3 H  (35.0-45.0)  mmHg


 


ABG pO2   70.0 L  (80.0-100.0)  mmHg


 


ABG HCO3   25.9  (22.0-26.0)  meq/L


 


ABG O2 Saturation   93.4 L  (96.0-97.0)  %


 


ABG Base Excess   0.2  (-2-2.0)  


 


Art Test   Positive  


 


A-a Gradient   23  mmHg


 


O2 Delivery Device   Nasal cannula  


 


Oxygen Flow Rate   1.0  


 


FiO2   24.00  (21..00)  %


 


Sodium    (136-145)  mEq/L


 


Potassium    (3.5-5.1)  mEq/L


 


Chloride    ()  mEq/L


 


Carbon Dioxide    (21-32)  mEq/L


 


Anion Gap    (5-15)  


 


BUN    (7-18)  mg/dL


 


Creatinine    (0.55-1.02)  mg/dL


 


Est Cr Clr Drug Dosing    mL/min


 


Estimated GFR (MDRD)    (>60)  mL/min


 


BUN/Creatinine Ratio    (14-18)  


 


Glucose    ()  mg/dL


 


Hemoglobin A1c    (4.50-6.20)  %


 


Lactic Acid    (0.4-2.0)  mmol/L


 


Calcium    (8.5-10.1)  mg/dL


 


Magnesium    (1.8-2.4)  mg/dl


 


Total Bilirubin    (0.2-1.0)  mg/dL


 


AST    (15-37)  U/L


 


ALT    (14-59)  U/L


 


Alkaline Phosphatase    ()  U/L


 


CK-MB (CK-2)    (0-3.6)  ng/ml


 


Troponin I    (0.00-0.056)  ng/mL


 


C-Reactive Protein    (<1.0)  mg/dL


 


NT-Pro-B Natriuret Pep    (0-125)  pg/mL


 


Total Protein    (6.4-8.2)  g/dl


 


Albumin    (3.4-5.0)  g/dl


 


Globulin    gm/dL


 


Albumin/Globulin Ratio    (1-2)  


 


Triglycerides  90   (<150)  mg/dL


 


Cholesterol  159   (<200)  mg/dL


 


LDL Cholesterol Direct  82   (<100)  mg/dL


 


HDL Cholesterol  69.0 H   (40-59)  mg/dL


 


Mycoplasma pneumon IgM    (NEGATIVE)  











[facility] Results - Last 24 hrs: 


 Microbiology











 03/06/19 02:31 Group A Streptococcus Rapid Screen - Final





 Throat    NEGATIVE STREP A SCREEN


 


 03/05/19 14:24 Influenza Type A Antigen Screen - Final





 Nasal Aspirate, Unspecified    Positive Influenza A Ag





 Influenza Type B Antigen Screen - Final





    NEGATIVE INFLUENZA B VIRUS AG











Med Orders - Current: 


 Current Medications





Albuterol (Proventil Neb Soln)  2.5 mg NEB Q4HRRT PRN


   PRN Reason: Shortness of Breath


Albuterol/Ipratropium (Duoneb 3.0-0.5 Mg/3 Ml)  3 ml NEB QIDRT [facility]


   Last Admin: 03/06/19 05:01 Dose:  3 ml


Aspirin (Aspirin)  81 mg PO DAILY [facility]


Benzocaine/Menthol (Cepacol Sore Throat)  1 lozenge MUCMEM Q2HR PRN


   PRN Reason: sore throat 


   Last Admin: 03/06/19 02:26 Dose:  1 lozenge


Carvedilol (Coreg)  6.25 mg PO BID [facility]


   Last Admin: 03/06/19 08:33 Dose:  6.25 mg


Enoxaparin Sodium (Lovenox)  40 mg SUBCUT Q24H [facility]


   Last Admin: 03/05/19 21:26 Dose:  40 mg


Hydralazine HCl (Apresoline)  20 mg IVPUSH Q6H PRN


   PRN Reason: Hypertension


Lorazepam (Ativan)  1 mg PO TID PRN


   PRN Reason: Anxiety


Miscellaneous Information (Remove Patch)  1 ea TRDERM Q24H [facility]


Montelukast Sodium (Singulair)  10 mg PO BEDTIME [facility]


   Last Admin: 03/05/19 21:26 Dose:  10 mg


Nicotine (Habitrol)  14 mg TOP DAILY [facility]


   Last Admin: 03/06/19 08:47 Dose:  14 mg


Oseltamivir Phosphate (Tamiflu)  30 mg PO BID [facility]


Paroxetine HCl (Paxil)  40 mg PO DAILY [facility]


   Last Admin: 03/06/19 08:34 Dose:  40 mg


Simvastatin (Zocor)  10 mg PO BEDTIME [facility]


   Last Admin: 03/05/19 21:27 Dose:  10 mg


Sodium Chloride (Saline Flush)  10 ml FLUSH ASDIRECTED PRN


   PRN Reason: Keep Vein Open


   Last Admin: 03/05/19 15:10 Dose:  10 ml


Zolpidem Tartrate (Ambien)  10 mg PO BEDTIME [facility]


   Last Admin: 03/05/19 21:26 Dose:  10 mg





Discontinued Medications





Albuterol/Ipratropium (Duoneb 3.0-0.5 Mg/3 Ml)  3 ml NEB ONETIME ONE


   Stop: 03/05/19 14:19


   Last Admin: 03/05/19 14:49 Dose:  3 ml


Albuterol/Ipratropium (Duoneb 3.0-0.5 Mg/3 Ml)  3 ml NEB QID [facility]


Aspirin (Aspirin)  81 mg PO DAILY [facility]


Aspirin (Aspirin)  324 mg PO ONETIME ONE


   Stop: 03/06/19 07:10


   Last Admin: 03/06/19 08:31 Dose:  324 mg


Clopidogrel Bisulfate (Plavix)  300 mg PO ONETIME ONE


   Stop: 03/06/19 07:26


   Last Admin: 03/06/19 08:32 Dose:  300 mg


Furosemide (Lasix)  40 mg IVPUSH NOW ONE


   Stop: 03/05/19 14:32


   Last Admin: 03/05/19 15:10 Dose:  40 mg


Nicotine (Habitrol)  14 mg TOP DAILY [facility]


Oseltamivir Phosphate (Tamiflu)  75 mg PO ONETIME ONE


   Stop: 03/05/19 16:58


   Last Admin: 03/05/19 17:03 Dose:  75 mg


Oseltamivir Phosphate (Tamiflu)  75 mg PO BID [facility]


   Last Admin: 03/06/19 08:36 Dose:  Not Given











- Exam


Quality Assessment: Reports: Supplemental Oxygen, DVT Prophylaxis


General: Reports: Alert, Oriented, Cooperative, No Acute Distress


HEENT: Reports: Pupils Equal, Pupils Reactive, EOMI, Mucous Membr. Moist/Pink


Neck: Reports: Supple


Lungs: Reports: Normal Respiratory Effort, Decreased Breath Sounds, Wheezing (

very mild occasional )


Cardiovascular: Reports: Regular Rate, Regular Rhythm


GI/Abdominal Exam: Normal Bowel Sounds, Soft, Non-Tender, No Organomegaly, No 

Distention


 (Female) Exam: Deferred


Rectal (Female) Exam: Deferred


Back Exam: Reports: Normal Inspection, Full Range of Motion


Extremities: Normal Inspection, Normal Range of Motion, Non-Tender, No Pedal 

Edema, Normal Capillary Refill


Skin: Reports: Warm, Dry, Intact


Neurological: Reports: No New Focal Deficit


Psy/Mental Status: Reports: Alert, Normal Affect, Normal Mood

## 2019-03-06 NOTE — CR
Chest: Two views of the chest were obtained.

 

Comparison: Prior chest x-ray 03/05/19.

 

Heart size and mediastinum are normal.  Slightly prominent epicardial 

fat pad is noted within the right cardiophrenic angle.  Lungs are 

clear with no acute parenchymal change.  Bony structures show old 

healed left clavicle fracture.

 

Impression:

1.  Incidental findings.  Nothing acute is seen.

 

Diagnostic code #2

## 2023-03-16 NOTE — PCM.HP
H&P History of Present Illness





- General


Date of Service: 03/05/19


Admit Problem/Dx: 


 Admission Diagnosis/Problem





Admission Diagnosis/Problem      Influenza due to influenza A virus








  ** Headache


Pain Score (Numeric/FACES): 10





- Related Data


Allergies/Adverse Reactions: 


 Allergies











Allergy/AdvReac Type Severity Reaction Status Date / Time


 


No Known Allergies Allergy   Verified 03/05/19 18:43











Home Medications: 


 Home Meds





LORazepam [Ativan] 1 mg PO TID PRN 12/30/18 [History]


Zolpidem [Ambien] 10 mg PO BEDTIME 12/30/18 [History]


Aspirin 81 mg PO DAILY 01/04/19 [History]


Carvedilol 6.25 mg PO BID 01/04/19 [History]


Montelukast [Singulair] 10 mg PO BEDTIME 01/04/19 [History]


Nicotine [Nicotine Patch] 14 mg TOP DAILY 01/04/19 [History]


Albuterol/Ipratropium [DuoNeb 3.0-0.5 MG/3 ML] 3 ml NEB QID PRN 01/27/19 [

History]


Furosemide [Lasix] 20 mg PO DAILY 01/27/19 [History]


PARoxetine HCl [Paroxetine HCl] 40 mg PO DAILY 01/27/19 [History]


Potassium Chloride 10 meq PO DAILY 01/27/19 [History]


Losartan [Cozaar] 25 mg PO DAILY 03/05/19 [History]


atorvaSTATin [Lipitor] 10 mg PO DAILY 03/05/19 [History]











Past Medical History


HEENT History: Reports: Impaired Vision, Other (See Below)


Other HEENT History: wears eyeglasses.


Cardiovascular History: Reports: Cardiomyopathy, Hypertension, MI, Other (See 

Below)


Other Cardiovascular History: Heart Attack December 30, 2018


Respiratory History: Reports: Bronchitis, Recurrent, COPD, SOB, Other (See Below

)


Other Respiratory History: allergies


Genitourinary History: Reports: UTI, Recurrent


OB/GYN History: Reports: Pregnancy


Musculoskeletal History: Reports: Fracture


Psychiatric History: Reports: Anxiety, Depression





- Infectious Disease History


Infectious Disease History: Reports: Chicken Pox, Measles, Mumps





- Past Surgical History


HEENT Surgical History: Reports: None


Cardiovascular Surgical History: Reports: None


Respiratory Surgical History: Reports: None





Social & Family History





- Family History


Family Medical History: Noncontributory





- Tobacco Use


Smoking Status *Q: Former Smoker


Years of Tobacco use: 45


Used Tobacco, but Quit: Yes


Month/Year Tobacco Last Used: December 30, 2019


Second Hand Smoke Exposure: No





- Caffeine Use


Caffeine Use: Reports: Coffee





- Recreational Drug Use


Recreational Drug Use: No





- Living Situation & Occupation


Living situation: Reports: 


Occupation: Retired





Exam





- Vital Signs


Vital Signs: 


 Last Vital Signs











Temp  36.6 C   03/05/19 18:13


 


Pulse  86   03/05/19 18:13


 


Resp  28 H  03/05/19 18:13


 


BP  130/79   03/05/19 18:13


 


Pulse Ox  93 L  03/05/19 18:13











Weight: 74.389 kg





- Patient Data


Lab Results Last 24 hrs: 


 Laboratory Results - last 24 hr











  03/05/19 03/05/19 03/05/19 Range/Units





  14:08 14:24 14:24 


 


WBC   5.37   (3.98-10.04)  K/mm3


 


RBC   4.27   (3.98-5.22)  M/mm3


 


Hgb   13.1   (11.2-15.7)  gm/L


 


Hct   39.2   (34.1-44.9)  %


 


MCV   91.8   (79.4-94.8)  fl


 


MCH   30.7   (25.6-32.2)  pg


 


MCHC   33.4   (32.2-35.5)  g/dl


 


RDW Std Deviation   43.6   (36.4-46.3)  fL


 


Plt Count   232   (182-369)  K/mm3


 


MPV   10.7   (9.4-12.3)  fl


 


Neutrophils % (Manual)   82 H   (40-60)  %


 


Band Neutrophils %   0   (0-10)  %


 


Lymphocytes % (Manual)   12 L   (20-40)  %


 


Atypical Lymphs %   0   %


 


Monocytes % (Manual)   5   (2-10)  %


 


Eosinophils % (Manual)   1   (0.7-5.8)  %


 


Basophils % (Manual)   0 L   (0.1-1.2)  


 


Platelet Estimate   Adequate   


 


RBC Morph Comment   Normal   


 


ESR     (0-20)  mm/hr


 


PT    10.8  (9.5-12.1)  SECONDS


 


INR    0.99  


 


D-Dimer, Quantitative    0.66 H  (0.19-0.50)  mg/L


 


Puncture Site  Rt radial    


 


ABG pH  7.35    (7.35-7.45)  


 


ABG pCO2  47.2 H    (35.0-45.0)  mmHg


 


ABG pO2  69.0 L    (80.0-100.0)  mmHg


 


ABG HCO3  25.3    (22.0-26.0)  meq/L


 


ABG O2 Saturation  93.3 L    (96.0-97.0)  %


 


ABG Base Excess  -0.2    (-2-2.0)  


 


A-a Gradient  51    mmHg


 


O2 Delivery Device  Nasal cannula    


 


Oxygen Flow Rate  2.0    


 


FiO2  28.00    (21..00)  %


 


Sodium     (136-145)  mEq/L


 


Potassium     (3.5-5.1)  mEq/L


 


Chloride     ()  mEq/L


 


Carbon Dioxide     (21-32)  mEq/L


 


Anion Gap     (5-15)  


 


BUN     (7-18)  mg/dL


 


Creatinine     (0.55-1.02)  mg/dL


 


Est Cr Clr Drug Dosing     mL/min


 


Estimated GFR (MDRD)     (>60)  mL/min


 


BUN/Creatinine Ratio     (14-18)  


 


Glucose     ()  mg/dL


 


Calcium     (8.5-10.1)  mg/dL


 


Magnesium     (1.8-2.4)  mg/dl


 


Total Bilirubin     (0.2-1.0)  mg/dL


 


AST     (15-37)  U/L


 


ALT     (14-59)  U/L


 


Alkaline Phosphatase     ()  U/L


 


CK-MB (CK-2)     (0-3.6)  ng/ml


 


Troponin I     (0.00-0.056)  ng/mL


 


C-Reactive Protein     (<1.0)  mg/dL


 


NT-Pro-B Natriuret Pep     (0-125)  pg/mL


 


Total Protein     (6.4-8.2)  g/dl


 


Albumin     (3.4-5.0)  g/dl


 


Globulin     gm/dL


 


Albumin/Globulin Ratio     (1-2)  


 


Mycoplasma pneumon IgM     (NEGATIVE)  














  03/05/19 03/05/19 03/05/19 Range/Units





  14:24 14:24 14:24 


 


WBC     (3.98-10.04)  K/mm3


 


RBC     (3.98-5.22)  M/mm3


 


Hgb     (11.2-15.7)  gm/L


 


Hct     (34.1-44.9)  %


 


MCV     (79.4-94.8)  fl


 


MCH     (25.6-32.2)  pg


 


MCHC     (32.2-35.5)  g/dl


 


RDW Std Deviation     (36.4-46.3)  fL


 


Plt Count     (182-369)  K/mm3


 


MPV     (9.4-12.3)  fl


 


Neutrophils % (Manual)     (40-60)  %


 


Band Neutrophils %     (0-10)  %


 


Lymphocytes % (Manual)     (20-40)  %


 


Atypical Lymphs %     %


 


Monocytes % (Manual)     (2-10)  %


 


Eosinophils % (Manual)     (0.7-5.8)  %


 


Basophils % (Manual)     (0.1-1.2)  


 


Platelet Estimate     


 


RBC Morph Comment     


 


ESR   25 H   (0-20)  mm/hr


 


PT     (9.5-12.1)  SECONDS


 


INR     


 


D-Dimer, Quantitative     (0.19-0.50)  mg/L


 


Puncture Site     


 


ABG pH     (7.35-7.45)  


 


ABG pCO2     (35.0-45.0)  mmHg


 


ABG pO2     (80.0-100.0)  mmHg


 


ABG HCO3     (22.0-26.0)  meq/L


 


ABG O2 Saturation     (96.0-97.0)  %


 


ABG Base Excess     (-2-2.0)  


 


A-a Gradient     mmHg


 


O2 Delivery Device     


 


Oxygen Flow Rate     


 


FiO2     (21..00)  %


 


Sodium  135 L    (136-145)  mEq/L


 


Potassium  4.3    (3.5-5.1)  mEq/L


 


Chloride  98    ()  mEq/L


 


Carbon Dioxide  27    (21-32)  mEq/L


 


Anion Gap  14.3    (5-15)  


 


BUN  13    (7-18)  mg/dL


 


Creatinine  0.9    (0.55-1.02)  mg/dL


 


Est Cr Clr Drug Dosing  53.10    mL/min


 


Estimated GFR (MDRD)  > 60    (>60)  mL/min


 


BUN/Creatinine Ratio  14.4    (14-18)  


 


Glucose  150 H    ()  mg/dL


 


Calcium  9.1    (8.5-10.1)  mg/dL


 


Magnesium  1.9    (1.8-2.4)  mg/dl


 


Total Bilirubin  0.4    (0.2-1.0)  mg/dL


 


AST  24    (15-37)  U/L


 


ALT  23    (14-59)  U/L


 


Alkaline Phosphatase  67    ()  U/L


 


CK-MB (CK-2)  0.8    (0-3.6)  ng/ml


 


Troponin I  0.036    (0.00-0.056)  ng/mL


 


C-Reactive Protein  9.4 H*    (<1.0)  mg/dL


 


NT-Pro-B Natriuret Pep    1257 H  (0-125)  pg/mL


 


Total Protein  7.0    (6.4-8.2)  g/dl


 


Albumin  3.5    (3.4-5.0)  g/dl


 


Globulin  3.5    gm/dL


 


Albumin/Globulin Ratio  1.0    (1-2)  


 


Mycoplasma pneumon IgM  Negative    (NEGATIVE)  











Result Diagrams: 


 03/05/19 14:24





 03/05/19 14:24


Johnny Results Last 24 hrs: 


 Microbiology











 03/05/19 14:24 Influenza Type A Antigen Screen - Final





 Nasal Aspirate, Unspecified    Positive Influenza A Ag





 Influenza Type B Antigen Screen - Final





    NEGATIVE INFLUENZA B VIRUS AG











Orders Last 24hrs: 


 Active Orders 24 hr











 Category Date Time Status


 


 Admission Status [Patient Status] [ADT] Routine ADT  03/05/19 17:47 Active


 


 EKG Documentation Completion [RC] STAT Care  03/05/19 14:16 Active


 


 Oxygen Therapy [RC] ASDIRECTED Care  03/05/19 14:16 Active


 


 Peripheral IV Care [RC] .AS DIRECTED Care  03/05/19 14:18 Active


 


 RT Aerosol Therapy [RC] ASDIRECTED Care  03/05/19 14:18 Active


 


 Sodium Chloride 0.9% [Saline Flush] Med  03/05/19 14:17 Active





 10 ml FLUSH ASDIRECTED PRN   


 


 Peripheral IV Insertion Adult [OM.PC] Stat Oth  03/05/19 14:18 Ordered








 Medication Orders





Sodium Chloride (Saline Flush)  10 ml FLUSH ASDIRECTED PRN


   PRN Reason: Keep Vein Open


   Last Admin: 03/05/19 15:10  Dose: 10 ml
normal for race